# Patient Record
Sex: MALE | Employment: PART TIME | URBAN - METROPOLITAN AREA
[De-identification: names, ages, dates, MRNs, and addresses within clinical notes are randomized per-mention and may not be internally consistent; named-entity substitution may affect disease eponyms.]

---

## 2018-02-08 ENCOUNTER — TRANSFERRED RECORDS (OUTPATIENT)
Dept: HEALTH INFORMATION MANAGEMENT | Facility: CLINIC | Age: 73
End: 2018-02-08

## 2018-06-20 ENCOUNTER — ONCOLOGY VISIT (OUTPATIENT)
Dept: ONCOLOGY | Facility: CLINIC | Age: 73
End: 2018-06-20
Attending: INTERNAL MEDICINE
Payer: MEDICARE

## 2018-06-20 ENCOUNTER — CARE COORDINATION (OUTPATIENT)
Dept: ONCOLOGY | Facility: CLINIC | Age: 73
End: 2018-06-20

## 2018-06-20 VITALS
WEIGHT: 200.3 LBS | SYSTOLIC BLOOD PRESSURE: 132 MMHG | HEART RATE: 74 BPM | BODY MASS INDEX: 27.13 KG/M2 | OXYGEN SATURATION: 98 % | TEMPERATURE: 99.6 F | HEIGHT: 72 IN | DIASTOLIC BLOOD PRESSURE: 74 MMHG

## 2018-06-20 DIAGNOSIS — C61 MALIGNANT NEOPLASM OF PROSTATE (H): Primary | ICD-10-CM

## 2018-06-20 LAB
ALBUMIN SERPL-MCNC: 3.9 G/DL (ref 3.4–5)
ALP SERPL-CCNC: 67 U/L (ref 40–150)
ALT SERPL W P-5'-P-CCNC: 16 U/L (ref 0–70)
ANION GAP SERPL CALCULATED.3IONS-SCNC: 6 MMOL/L (ref 3–14)
AST SERPL W P-5'-P-CCNC: 12 U/L (ref 0–45)
BASOPHILS # BLD AUTO: 0 10E9/L (ref 0–0.2)
BASOPHILS NFR BLD AUTO: 0.4 %
BILIRUB SERPL-MCNC: 0.5 MG/DL (ref 0.2–1.3)
BUN SERPL-MCNC: 22 MG/DL (ref 7–30)
CALCIUM SERPL-MCNC: 8.6 MG/DL (ref 8.5–10.1)
CHLORIDE SERPL-SCNC: 109 MMOL/L (ref 94–109)
CO2 SERPL-SCNC: 27 MMOL/L (ref 20–32)
CREAT SERPL-MCNC: 0.89 MG/DL (ref 0.66–1.25)
DIFFERENTIAL METHOD BLD: NORMAL
EOSINOPHIL # BLD AUTO: 0.1 10E9/L (ref 0–0.7)
EOSINOPHIL NFR BLD AUTO: 1.4 %
ERYTHROCYTE [DISTWIDTH] IN BLOOD BY AUTOMATED COUNT: 13 % (ref 10–15)
GFR SERPL CREATININE-BSD FRML MDRD: 84 ML/MIN/1.7M2
GLUCOSE SERPL-MCNC: 89 MG/DL (ref 70–99)
HCT VFR BLD AUTO: 45.1 % (ref 40–53)
HGB BLD-MCNC: 14.6 G/DL (ref 13.3–17.7)
IMM GRANULOCYTES # BLD: 0 10E9/L (ref 0–0.4)
IMM GRANULOCYTES NFR BLD: 0.4 %
LYMPHOCYTES # BLD AUTO: 1 10E9/L (ref 0.8–5.3)
LYMPHOCYTES NFR BLD AUTO: 17.1 %
MCH RBC QN AUTO: 31.5 PG (ref 26.5–33)
MCHC RBC AUTO-ENTMCNC: 32.4 G/DL (ref 31.5–36.5)
MCV RBC AUTO: 97 FL (ref 78–100)
MONOCYTES # BLD AUTO: 0.4 10E9/L (ref 0–1.3)
MONOCYTES NFR BLD AUTO: 7.6 %
NEUTROPHILS # BLD AUTO: 4.1 10E9/L (ref 1.6–8.3)
NEUTROPHILS NFR BLD AUTO: 73.1 %
NRBC # BLD AUTO: 0 10*3/UL
NRBC BLD AUTO-RTO: 0 /100
PLATELET # BLD AUTO: 183 10E9/L (ref 150–450)
POTASSIUM SERPL-SCNC: 4.8 MMOL/L (ref 3.4–5.3)
PROT SERPL-MCNC: 7.3 G/DL (ref 6.8–8.8)
PSA SERPL-MCNC: 0.28 UG/L (ref 0–4)
RBC # BLD AUTO: 4.63 10E12/L (ref 4.4–5.9)
SODIUM SERPL-SCNC: 142 MMOL/L (ref 133–144)
WBC # BLD AUTO: 5.6 10E9/L (ref 4–11)

## 2018-06-20 PROCEDURE — 84403 ASSAY OF TOTAL TESTOSTERONE: CPT | Performed by: INTERNAL MEDICINE

## 2018-06-20 PROCEDURE — 36415 COLL VENOUS BLD VENIPUNCTURE: CPT

## 2018-06-20 PROCEDURE — 80053 COMPREHEN METABOLIC PANEL: CPT | Performed by: INTERNAL MEDICINE

## 2018-06-20 PROCEDURE — G0463 HOSPITAL OUTPT CLINIC VISIT: HCPCS | Mod: ZF

## 2018-06-20 PROCEDURE — 99205 OFFICE O/P NEW HI 60 MIN: CPT | Mod: ZP | Performed by: INTERNAL MEDICINE

## 2018-06-20 PROCEDURE — 85025 COMPLETE CBC W/AUTO DIFF WBC: CPT | Performed by: INTERNAL MEDICINE

## 2018-06-20 PROCEDURE — 84153 ASSAY OF PSA TOTAL: CPT | Performed by: INTERNAL MEDICINE

## 2018-06-20 RX ORDER — DUTASTERIDE AND TAMSULOSIN HYDROCHLORIDE CAPSULES .5; .4 MG/1; MG/1
CAPSULE ORAL
COMMUNITY
End: 2018-06-20 | Stop reason: DRUGHIGH

## 2018-06-20 RX ORDER — TAMSULOSIN HYDROCHLORIDE 0.4 MG/1
CAPSULE ORAL
Refills: 0 | COMMUNITY
Start: 2018-05-15 | End: 2018-06-22

## 2018-06-20 ASSESSMENT — PAIN SCALES - GENERAL: PAINLEVEL: NO PAIN (0)

## 2018-06-20 NOTE — LETTER
6/20/2018      RE: Jam Nice  [address]  [facility] [address]       [facility]    NEW PATIENT VISIT NOTE    PATIENT NAME: Jam Niec MRN # [id number]  DATE OF VISIT: June 20, 2018 YOB: 1945    REFERRING PROVIDER: Referred Self, MD  No address on file    CANCER TYPE: Prostate cancer  STAGE: M0 Stage II with biochemical PSA relapse post radiation therapy with concomitant hormones     TREATMENT SUMMARY:  - Prostate biopsy in 2008 for rising PSA - 4.8; revealed Cady 3+3 disease  -  He received two injections of three-month Zoladex Depot (10/3/08 and 1/5/09) in prep for concurrent radiation therapy but opted for surveillance  -  Rpt Prostate biopsy for rising PSA done on 10/13/10 revealed again Cady 3+3 disease  - He had repeat prostate biopsy for rising PSA on 8/26/14 which now revealed Cady 3+4 disease  - He was treated with ADT - Zoladex Depot and recieved 3 doses (10.8 mg) on October 8, 2014, January 9, 2015, and April 2, 2015, respectively.  - He underwent definitive radiation therapy from June 9, 2015 --- August 3, 2015 and received 7800 cGy in 39 fractions.  - He is being followed with PSA every 6 months since then    CURRENT INTERVENTIONS:  Observation     HISTORY OF PRESENT ILLNESS   Jam Ncie is 73 year old retired gentleman with no PMH other than prostate cancer treated with definitive therapy seeks to establish care at [facility]    Bill is alone at this visit. History is presented by him and as per charts from [facility]. He points me to the printed note from [facility] dated 6/13/17.   He had rising screening PSA which was elevated at 4.8. His prostate cancer therapy and progression has been summarized above. He completed his radiation therapy in Aug 2015 and has recovered well from therapy. His last ADT was in April 2015.     He has no residual complains. He has good energy and appetite. He has  unlimited exercise tolerance. He denies any weight loss. He is very active and does walk 5 miles daily.      PAST MEDICAL HISTORY   - Prostate cancer treated with definitive radiation therapy and concurrent hormonal therapy  - Squamous cell cancer      CURRENT OUTPATIENT MEDICATIONS     Current Outpatient Prescriptions   Medication Sig     PSYLLIUM HUSK PO      tamsulosin (FLOMAX) 0.4 MG capsule TAKE 1 CAPSULE BY MOUTH EVERY DAY     No current facility-administered medications for this visit.         ALLERGIES      Allergies   Allergen Reactions     Cats Cough and Itching     Simvastatin      Aches, low energy, not feeling well        SOCIAL HISTORY   - He is  and lives with his wife. He does not have kids. He has a dog.     - He is retired from [employer]. 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 He worked at [employer] for 10 yrs - part time.     - He does not smoke. He quit in . He smoked between 1-2 packs per day for about 21 yrs.  - He denies alcohol or recreational drug use.      FAMILY HISTORY   - Father  of heart attack 59 yrs of age  - Mother lived to be 90 yrs of age     REVIEW OF SYSTEMS   As above in the HPI, o/w complete 12-point ROS was negative.     PHYSICAL EXAM   /74  Pulse 74  Temp 99.6  F (37.6  C) (Oral)  Ht 1.829 m (6')  Wt 90.9 kg (200 lb 4.8 oz)  SpO2 98%  BMI 27.17 kg/m2   SpO2 Readings from Last 4 Encounters:   18 98%     Wt Readings from Last 3 Encounters:   18 90.9 kg (200 lb 4.8 oz)     GEN: NAD  HEENT: PERRL, EOMI, no icterus, injection or pallor. Oropharynx is clear.  NECK: no cervical or supraclavicular lymphadenopathy  LUNGS: clear bilaterally  CV: regular, no murmurs, rubs, or gallops  ABDOMEN: soft, non-tender, non-distended, normal bowel sounds, no hepatosplenomegaly by percussion or palpation  EXT: warm, well perfused, no edema  NEURO: alert  SKIN: no rashes     LABORATORY AND IMAGING STUDIES   Outside labs and scans reviewed      ASSESSMENT    1. Hormone  sensitive prostate cancer - Cady 3+4     s/p definitive radiation therapy 7800 cGy in 39 fractions from 6/9-8/3/15    ADT with 3 doses from Oct 2014 through April 2015  2. ECOG PS 0  3. No medical comorbidities  4. Followed every 6 months for surveillance    DISCUSSION     I had a lengthy discussion with Alvin who comes alone at this clinic visit.  He had definitive radiation therapy and received 1800 cGy in 39 fractions from 06/2015 through 08/2015.  He also received about 9 months of therapy with Zoladex for androgen depravation.  His PSA has been rising since 06/2016 when it was detectable at 0.13.  It has increased to 0.23 about a year ago.  At that time, he had a doubling time of 1 year, although not many values are available.  I did explain to him that this could just be from some normal prostate tissue that has survived despite radiation therapy.  For now, he does not have definitive recurrence.  We will continue to follow him every 6 months with PSA values.  He would like to transfer his care over here for convenience.  I would be most happy to arrange this.  I will have lab repeated today and then I will again see him in about 6 months.  I will release his lab by Lisa for his review.      I did explain that even if the PSA begins to rise it would not need to be treated any time soon.  Salvage surgery after definitive radiation therapy is very difficult.  We could get additional imaging in the form of Axumin PET/CT scan done, but the chances of easily resectable disease would remain low.  With recurrent disease, for the most part, would mean incurable but definitely treatable disease.  However, there is no benefit for early treatment of biochemically recurrent prostate cancer.  I would continue to observe him and treat him only if there is a significant rise in his PSA value.  There is no specific PSA value at which I initiate therapy, but it would also depend on the rate of rise-PSA doubling time of 6  months is of more concern and there would be more immediate need to initiate therapy.      He would like to switch his primary care physician to [facility] too.  I have suggested him a few names.      PLAN   Labs today including CBC, CMP, PSA and testosterone  I will see him in 6 months with labs prior to clinic visit     Over 60 min of direct face to face time spent with patient with more than 50% time spent in counseling and coordinating care.          Franky Arroyo  Adj ,  Division of Hematology, Oncology & Transplantation  [facility]. '      Franky Arroyo MD

## 2018-06-20 NOTE — LETTER
2018       RE: Jam Nice   [address] [facility] [address]     Dear Colleague,    Thank you for referring your patient, Jam Nice, to the [facility]. Please see a copy of my visit note below.    [facility]    NEW PATIENT VISIT NOTE    PATIENT NAME: Jam Nice MRN # [id number]  DATE OF VISIT: 2018 YOB: 1945    REFERRING PROVIDER: Referred Self, MD  No address on file    CANCER TYPE:  STAGE:      TREATMENT SUMMARY:    CURRENT INTERVENTIONS:     HISTORY OF PRESENT ILLNESS   Jam Nice is 73 year old retired gentleman with no PMH other than prostate cancer treated with definitive therapy seeks to establish care at [facility]    Bill is alone at this visit. History is presented by him and as per charts from [facility]. He points me to the printed note from [facility] dated 18.   He had rising screening PSA which was elevated at 4.8.     PAST MEDICAL HISTORY   - Prostate cancer treated with definitive radiation therapy and concurrent hormonal therapy  - Squamous cell cancer      CURRENT OUTPATIENT MEDICATIONS     Current Outpatient Prescriptions   Medication Sig     PSYLLIUM HUSK PO      tamsulosin (FLOMAX) 0.4 MG capsule TAKE 1 CAPSULE BY MOUTH EVERY DAY     No current facility-administered medications for this visit.         ALLERGIES      Allergies   Allergen Reactions     Cats Cough and Itching     Simvastatin      Aches, low energy, not feeling well        SOCIAL HISTORY   - He is  and lives with his wife. He does not have kids. He has a dog.     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     - He does not smoke. He quit in . He smoked between 1-2 packs per day for about 21 yrs.  - He denies alcohol or recreational drug use.      FAMILY HISTORY   - Father  of heart attack 59 yrs of age  - Mother  lived to be 90 yrs of age     REVIEW OF SYSTEMS   As above in the HPI, o/w complete 12-point ROS was negative.     PHYSICAL EXAM   /74  Pulse 74  Temp 99.6  F (37.6  C) (Oral)  Ht 1.829 m (6')  Wt 90.9 kg (200 lb 4.8 oz)  SpO2 98%  BMI 27.17 kg/m2   SpO2 Readings from Last 4 Encounters:   06/20/18 98%     Wt Readings from Last 3 Encounters:   06/20/18 90.9 kg (200 lb 4.8 oz)     GEN: NAD  HEENT: PERRL, EOMI, no icterus, injection or pallor. Oropharynx is clear.  NECK: no cervical or supraclavicular lymphadenopathy  LUNGS: clear bilaterally  CV: regular, no murmurs, rubs, or gallops  ABDOMEN: soft, non-tender, non-distended, normal bowel sounds, no hepatosplenomegaly by percussion or palpation  EXT: warm, well perfused, no edema  NEURO: alert  SKIN: no rashes     LABORATORY AND IMAGING STUDIES         ASSESSMENT        DISCUSSION          PLAN       Franky [facility] ,  Division of Hematology, Oncology & Transplantation  [facility]. '

## 2018-06-20 NOTE — MR AVS SNAPSHOT
After Visit Summary   6/20/2018    Jam Nice    MRN: [id number]           Patient Information     Date Of Birth          1945        Visit Information        Provider Department      6/20/2018 10:00 AM Franky Arroyo MD [facility]        Today's Diagnoses     Malignant neoplasm of prostate (H)    -  1       Follow-ups after your visit        Your next 10 appointments already scheduled     Aug 28, 2018  9:00 AM CDT   (Arrive by 8:45 AM)   PHYSICAL with Joey Davis MD   [facility] ([facility]    [address] [place] [address]  4th Floor  [facility] [address]   [phone]            Dec 18, 2018 10:00 AM CST   [facility] Draw with  [facility] DRAW   [facility] ([facility])    [address] [place] [address]  [address]  [facility] [id number] [phone]            Dec 19, 2018 10:00 AM CST   (Arrive by 9:45 AM)   Return Visit with Franky Arroyo MD   [facility] ([facility])    [address] [place] [address]  [address]  [facility] [id number] [phone]              Who to contact     If you have questions or need follow up information about today's clinic visit or your schedule please contact [facility] directly at [phone].  Normal or non-critical lab and imaging results will be communicated to you by MyChart, letter or phone within 4 business days after the clinic has received the results. If you do not hear from us within 7 days, please contact the clinic through MyChart or phone. If you have a critical or abnormal lab result, we will notify you by phone as soon as possible.  Submit refill requests through [employer] or call your pharmacy and they will forward the refill request to us. Please allow 3 business days for your refill to be completed.          Additional Information About Your Visit         [employer] Information     [employer] gives you secure access to your electronic health record. If you see a primary care provider, you can also send messages to your care team and make appointments. If you have questions, please call your primary care clinic.  If you do not have a primary care provider, please call [phone] and they will assist you.        Care EveryWhere ID     This is your Care EveryWhere ID. This could be used by other organizations to access your [facility] medical records  [id number]        Your Vitals Were     Pulse Temperature Height Pulse Oximetry BMI (Body Mass Index)       74 99.6  F (37.6  C) (Oral) 1.829 m (6') 98% 27.17 kg/m2        Blood Pressure from Last 3 Encounters:   No data found for BP    Weight from Last 3 Encounters:   No data found for Wt              Today, you had the following     No orders found for display         Today's Medication Changes          These changes are accurate as of 6/20/18 11:59 PM.  If you have any questions, ask your nurse or doctor.               Stop taking these medicines if you haven't already. Please contact your care team if you have questions.     dutasteride-tamsulosin HCl 0.5-0.4 MG Caps   Stopped by:  Franky Arroyo MD                    Primary Care Provider Office Phone # Fax #    Zeeshan Greco [phone]       [facility] [address]  [place] [address]        Equal Access to Services     [facility] : Hadii edelmira odeno Soallen, waaxda luqadaha, qaybta kaalmada ana, arnie bagley . So [facility] [phone].    ATENCIÓN: Si habla español, tiene a aguero disposición servicios gratuitos de asistencia lingüística. Llame al [phone].    We comply with applicable federal civil rights laws and [place] laws. We do not discriminate on the basis of race, color, national origin, age, disability, sex, sexual orientation, or gender identity.            Thank  you!     Thank you for choosing [facility]  for your care. Our goal is always to provide you with excellent care. Hearing back from our patients is one way we can continue to improve our services. Please take a few minutes to complete the written survey that you may receive in the mail after your visit with us. Thank you!             Your Updated Medication List - Protect others around you: Learn how to safely use, store and throw away your medicines at www.disposemymeds.org.          This list is accurate as of 6/20/18 11:59 PM.  Always use your most recent med list.                   Brand Name Dispense Instructions for use Diagnosis    PSYLLIUM HUSK PO       Malignant neoplasm of prostate (H)

## 2018-06-20 NOTE — PROGRESS NOTES
Writer met with patient and provided Dr. Arroyo's business card with contact information as well as wrote writer's contact information on the back of card.

## 2018-06-20 NOTE — PROGRESS NOTES
[facility]    NEW PATIENT VISIT NOTE    PATIENT NAME: Jam Nice MRN # [id number]  DATE OF VISIT: June 20, 2018 YOB: 1945    REFERRING PROVIDER: Referred Self, MD  No address on file    CANCER TYPE: Prostate cancer  STAGE: M0 Stage II with biochemical PSA relapse post radiation therapy with concomitant hormones     TREATMENT SUMMARY:  - Prostate biopsy in 2008 for rising PSA - 4.8; revealed White 3+3 disease  -  He received two injections of three-month Zoladex Depot (10/3/08 and 1/5/09) in prep for concurrent radiation therapy but opted for surveillance  -  Rpt Prostate biopsy for rising PSA done on 10/13/10 revealed again White 3+3 disease  - He had repeat prostate biopsy for rising PSA on 8/26/14 which now revealed Cady 3+4 disease  - He was treated with ADT - Zoladex Depot and recieved 3 doses (10.8 mg) on October 8, 2014, January 9, 2015, and April 2, 2015, respectively.  - He underwent definitive radiation therapy from June 9, 2015 --- August 3, 2015 and received 7800 cGy in 39 fractions.  - He is being followed with PSA every 6 months since then    CURRENT INTERVENTIONS:  Observation     HISTORY OF PRESENT ILLNESS   Jam Nice is 73 year old retired gentleman with no PMH other than prostate cancer treated with definitive therapy seeks to establish care at [facility]    Alvin is alone at this visit. History is presented by him and as per charts from [facility]. He points me to the printed note from [facility] dated 6/13/17.   He had rising screening PSA which was elevated at 4.8. His prostate cancer therapy and progression has been summarized above. He completed his radiation therapy in Aug 2015 and has recovered well from therapy. His last ADT was in April 2015.     He has no residual complains. He has good energy and appetite. He has unlimited exercise tolerance. He denies any weight loss. He is very active and does walk 5  miles daily.      PAST MEDICAL HISTORY   - Prostate cancer treated with definitive radiation therapy and concurrent hormonal therapy  - Squamous cell cancer      CURRENT OUTPATIENT MEDICATIONS     Current Outpatient Prescriptions   Medication Sig     PSYLLIUM HUSK PO      tamsulosin (FLOMAX) 0.4 MG capsule TAKE 1 CAPSULE BY MOUTH EVERY DAY     No current facility-administered medications for this visit.         ALLERGIES      Allergies   Allergen Reactions     Cats Cough and Itching     Simvastatin      Aches, low energy, not feeling well        SOCIAL HISTORY   - He is  and lives with his wife. He does not have kids. He has a dog.     - He is retired from [employer]. 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 He worked at [employer] for 10 yrs - part time.     - He does not smoke. He quit in . He smoked between 1-2 packs per day for about 21 yrs.  - He denies alcohol or recreational drug use.      FAMILY HISTORY   - Father  of heart attack 59 yrs of age  - Mother lived to be 90 yrs of age     REVIEW OF SYSTEMS   As above in the HPI, o/w complete 12-point ROS was negative.     PHYSICAL EXAM   /74  Pulse 74  Temp 99.6  F (37.6  C) (Oral)  Ht 1.829 m (6')  Wt 90.9 kg (200 lb 4.8 oz)  SpO2 98%  BMI 27.17 kg/m2   SpO2 Readings from Last 4 Encounters:   18 98%     Wt Readings from Last 3 Encounters:   18 90.9 kg (200 lb 4.8 oz)     GEN: NAD  HEENT: PERRL, EOMI, no icterus, injection or pallor. Oropharynx is clear.  NECK: no cervical or supraclavicular lymphadenopathy  LUNGS: clear bilaterally  CV: regular, no murmurs, rubs, or gallops  ABDOMEN: soft, non-tender, non-distended, normal bowel sounds, no hepatosplenomegaly by percussion or palpation  EXT: warm, well perfused, no edema  NEURO: alert  SKIN: no rashes     LABORATORY AND IMAGING STUDIES   Outside labs and scans reviewed      ASSESSMENT    1. Hormone sensitive prostate cancer - Cady 3+4     s/p definitive radiation therapy 7800 cGy in 39  fractions from 6/9-8/3/15    ADT with 3 doses from Oct 2014 through April 2015  2. ECOG PS 0  3. No medical comorbidities  4. Followed every 6 months for surveillance    DISCUSSION     I had a lengthy discussion with Alvin who comes alone at this clinic visit.  He had definitive radiation therapy and received 1800 cGy in 39 fractions from 06/2015 through 08/2015.  He also received about 9 months of therapy with Zoladex for androgen depravation.  His PSA has been rising since 06/2016 when it was detectable at 0.13.  It has increased to 0.23 about a year ago.  At that time, he had a doubling time of 1 year, although not many values are available.  I did explain to him that this could just be from some normal prostate tissue that has survived despite radiation therapy.  For now, he does not have definitive recurrence.  We will continue to follow him every 6 months with PSA values.  He would like to transfer his care over here for convenience.  I would be most happy to arrange this.  I will have lab repeated today and then I will again see him in about 6 months.  I will release his lab by Lisa for his review.      I did explain that even if the PSA begins to rise it would not need to be treated any time soon.  Salvage surgery after definitive radiation therapy is very difficult.  We could get additional imaging in the form of Axumin PET/CT scan done, but the chances of easily resectable disease would remain low.  With recurrent disease, for the most part, would mean incurable but definitely treatable disease.  However, there is no benefit for early treatment of biochemically recurrent prostate cancer.  I would continue to observe him and treat him only if there is a significant rise in his PSA value.  There is no specific PSA value at which I initiate therapy, but it would also depend on the rate of rise-PSA doubling time of 6 months is of more concern and there would be more immediate need to initiate therapy.       He would like to switch his primary care physician to [facility] too.  I have suggested him a few names.      PLAN   Labs today including CBC, CMP, PSA and testosterone  I will see him in 6 months with labs prior to clinic visit     Over 60 min of direct face to face time spent with patient with more than 50% time spent in counseling and coordinating care.          Franky [facility] ,  Division of Hematology, Oncology & Transplantation  [facility]. '

## 2018-06-20 NOTE — NURSING NOTE
Oncology Rooming Note    June 20, 2018 9:56 AM   Jam Nice is a 73 year old male who presents for:    Chief Complaint   Patient presents with     Oncology Clinic Visit     New patient; Prostate Ca     Initial Vitals: /74  Pulse 74  Temp 99.6  F (37.6  C) (Oral)  Ht 1.829 m (6')  Wt 90.9 kg (200 lb 4.8 oz)  SpO2 98%  BMI 27.17 kg/m2 Estimated body mass index is 27.17 kg/(m^2) as calculated from the following:    Height as of this encounter: 1.829 m (6').    Weight as of this encounter: 90.9 kg (200 lb 4.8 oz). Body surface area is 2.15 meters squared.  No Pain (0) Comment: Data Unavailable   No LMP for male patient.  Allergies reviewed: Yes  Medications reviewed: Yes    Medications: Medication refills not needed today.  Pharmacy name entered into EPIC: Data Unavailable    Clinical concerns: Transferring care from [facility] Dr Arroyo was notified.    12 minutes for nursing intake (face to face time)     Eneida Devine CMA

## 2018-06-20 NOTE — NURSING NOTE
Labs completed via venipuncture, patient discharged.    See flow sheets.    Eneida Devine CMA ([facility])

## 2018-06-21 LAB — TESTOST SERPL-MCNC: 223 NG/DL (ref 240–950)

## 2018-06-22 ENCOUNTER — TELEPHONE (OUTPATIENT)
Dept: INTERNAL MEDICINE | Facility: CLINIC | Age: 73
End: 2018-06-22

## 2018-06-22 DIAGNOSIS — C61 MALIGNANT NEOPLASM OF PROSTATE (H): ICD-10-CM

## 2018-06-22 RX ORDER — TAMSULOSIN HYDROCHLORIDE 0.4 MG/1
CAPSULE ORAL
Qty: 90 CAPSULE | Refills: 1 | Status: SHIPPED | OUTPATIENT
Start: 2018-06-22 | End: 2018-12-19

## 2018-06-22 NOTE — TELEPHONE ENCOUNTER
[facility] Call Center    Phone Message    May a detailed message be left on voicemail: yes    Reason for Call: Other: PT is being referred to Dr. Ojeda for a new PT.  Please review chart and follow up with the PT.     Action Taken: Message routed to:  [facility] ([facility] HIRAM

## 2018-06-28 NOTE — TELEPHONE ENCOUNTER
[employer] message sent to patient relaying information. Sandra Harden CMA at 11:32 AM on 6/28/2018

## 2018-06-29 ENCOUNTER — DOCUMENTATION ONLY (OUTPATIENT)
Dept: OTHER | Facility: CLINIC | Age: 73
End: 2018-06-29

## 2018-06-29 DIAGNOSIS — Z71.89 ACP (ADVANCE CARE PLANNING): Chronic | ICD-10-CM

## 2018-08-14 ASSESSMENT — ENCOUNTER SYMPTOMS
NAUSEA: 0
BLOATING: 0
BLOOD IN STOOL: 0
VOMITING: 0
CONSTIPATION: 0
JAUNDICE: 0
RECTAL PAIN: 0
ABDOMINAL PAIN: 0
BOWEL INCONTINENCE: 0
DIARRHEA: 0
HEARTBURN: 0

## 2018-08-14 ASSESSMENT — ACTIVITIES OF DAILY LIVING (ADL)
IN_THE_PAST_7_DAYS,_DID_YOU_NEED_HELP_FROM_OTHERS_TO_TAKE_CARE_OF_THINGS_SUCH_AS_LAUNDRY_AND_HOUSEKEEPING,_BANKING,_SHOPPING,_USING_THE_TELEPHONE,_FOOD_PREPARATION,_TRANSPORTATION,_OR_TAKING_YOUR_OWN_MEDICATIONS?: N
IN_THE_PAST_7_DAYS,_DID_YOU_NEED_HELP_FROM_OTHERS_TO_PERFORM_EVERYDAY_ACTIVITIES_SUCH_AS_EATING,_GETTING_DRESSED,_GROOMING,_BATHING,_WALKING,_OR_USING_THE_TOILET: N

## 2018-08-28 ENCOUNTER — OFFICE VISIT (OUTPATIENT)
Dept: INTERNAL MEDICINE | Facility: CLINIC | Age: 73
End: 2018-08-28
Payer: MEDICARE

## 2018-08-28 VITALS
BODY MASS INDEX: 26.95 KG/M2 | SYSTOLIC BLOOD PRESSURE: 134 MMHG | RESPIRATION RATE: 20 BRPM | DIASTOLIC BLOOD PRESSURE: 80 MMHG | OXYGEN SATURATION: 97 % | HEART RATE: 76 BPM | WEIGHT: 198.7 LBS

## 2018-08-28 DIAGNOSIS — Z00.00 ROUTINE HEALTH MAINTENANCE: Primary | ICD-10-CM

## 2018-08-28 DIAGNOSIS — Z11.59 NEED FOR HEPATITIS C SCREENING TEST: ICD-10-CM

## 2018-08-28 DIAGNOSIS — H91.93 BILATERAL HEARING LOSS, UNSPECIFIED HEARING LOSS TYPE: ICD-10-CM

## 2018-08-28 DIAGNOSIS — Z13.6 SCREENING FOR AAA (ABDOMINAL AORTIC ANEURYSM): ICD-10-CM

## 2018-08-28 DIAGNOSIS — Z87.891 HISTORY OF SMOKING: ICD-10-CM

## 2018-08-28 LAB — HCV AB SERPL QL IA: NONREACTIVE

## 2018-08-28 PROCEDURE — G0472 HEP C SCREEN HIGH RISK/OTHER: HCPCS | Performed by: INTERNAL MEDICINE

## 2018-08-28 RX ORDER — AMPICILLIN TRIHYDRATE 250 MG
600 CAPSULE ORAL 2 TIMES DAILY
Qty: 200 CAPSULE | Refills: 3 | COMMUNITY
Start: 2018-08-28 | End: 2018-12-21

## 2018-08-28 ASSESSMENT — PAIN SCALES - GENERAL: PAINLEVEL: NO PAIN (0)

## 2018-08-28 NOTE — PATIENT INSTRUCTIONS
[facility] Medication Refill Request Information:  * Please contact your pharmacy regarding ANY request for medication refills.  ** [facility] Prescription Fax = [phone]  * Please allow 3 business days for routine medication refills.  * Please allow 5 business days for controlled substance medication refills.     [facility] Test Result notification information:  *You will be notified with in 7-10 days of your appointment day regarding the results of your test.  If you are on MyChart you will be notified as soon as the provider has reviewed the results and signed off on them.    [facility]: [phone]

## 2018-08-28 NOTE — NURSING NOTE
Prevnar 13 shot given without problems, patient tolerated procedure well.Delores Young LPN 10:23 AM on 8/28/2018

## 2018-08-28 NOTE — NURSING NOTE
Chief Complaint   Patient presents with     Physical     Medicare wellness   Delores Young LPN 8:50 AM on 8/28/2018    Rooming Note  Health Maintenance   Health Maintenance Due   Topic Date Due     HEPATITIS C SCREENING  02/11/1963     AORTIC ANEURYSM SCREENING (SYSTEM ASSIGNED)  02/11/2011     PNEUMOCOCCAL (2 of 2 - PCV13) 11/30/2016    All health maintenance items discussed and pended.  Delores Young LPN 8:52 AM on 8/28/2018

## 2018-08-28 NOTE — MR AVS SNAPSHOT
After Visit Summary   8/28/2018    Jam Nice    MRN: [id number]           Patient Information     Date Of Birth          1945        Visit Information        Provider Department      8/28/2018 9:00 AM Joey Davis MD [facility]        Today's Diagnoses     Routine health maintenance    -  1    Need for hepatitis C screening test        Screening for AAA (abdominal aortic aneurysm)        History of smoking        Bilateral hearing loss, unspecified hearing loss type          Care Instructions    Primary Care Center Medication Refill Request Information:  * Please contact your pharmacy regarding ANY request for medication refills.  ** [facility] Prescription Fax = [phone]  * Please allow 3 business days for routine medication refills.  * Please allow 5 business days for controlled substance medication refills.     Primary Care Center Test Result notification information:  *You will be notified with in 7-10 days of your appointment day regarding the results of your test.  If you are on MyChart you will be notified as soon as the provider has reviewed the results and signed off on them.    [facility]: [phone]           Follow-ups after your visit        Additional Services     AUDIOLOGY ADULT REFERRAL       [facility] - [place] [phone]   [email or web address]    Specialty Testing:  Audiogram w/Tymps and Reflexes (Comprehensive Audiology Evaluation)            OTOLARYNGOLOGY REFERRAL       Your provider has referred you to: [facility]: Adult Ear, Nose and Throat Clinic (Otolaryngology) - [place] [phone]  [email or web address]    Please be aware that coverage of these services is subject to the terms and limitations of your health insurance plan.  Call member services at  your health plan with any benefit or coverage questions.      Please bring the following with you to your appointment:    (1) Any X-Rays, CTs or MRIs which have been performed.  Contact the facility where they were done to arrange for  prior to your scheduled appointment.   (2) List of current medications  (3) This referral request   (4) Any documents/labs given to you for this referral                  Follow-up notes from your care team     Return in about 1 year (around 8/28/2019).      Your next 10 appointments already scheduled     Oct 29, 2018 10:30 AM CDT   (Arrive by 10:15 AM)   New Patient Visit with Marina Montilla MD   [facility] Dermatology ([facility])    [address]  3rd [address]  [facility] [address] [phone]            Dec 18, 2018 10:00 AM CST   [facility] Lab Draw with  MASONIC LAB DRAW   [facility] Lab Draw ([facility])    [address] [place] [address]  [address]  [facility] [id number] [phone]            Dec 19, 2018 10:00 AM CST   (Arrive by 9:45 AM)   Return Visit with Franky Arroyo MD   [facility] ([facility])    [address] [place] [address]  [address]  [facility] [id number] [phone]              Future tests that were ordered for you today     Open Future Orders        Priority Expected Expires Ordered    Hepatitis C Screen Reflex to HCV RNA Quant and Genotype Routine 8/28/2018 8/28/2019 8/28/2018    US Aorta Medicare AAA Screening Routine  8/28/2019 8/28/2018            Who to contact     Please call your clinic at [phone] to:    Ask questions about your health    Make or cancel appointments    Discuss your medicines    Learn about your test results    Speak to your doctor            Additional Information About Your Visit        [employer] Information     [employer] gives you secure access to your electronic health record. If you see a primary care  provider, you can also send messages to your care team and make appointments. If you have questions, please call your primary care clinic.  If you do not have a primary care provider, please call [phone] and they will assist you.      [facility] is an electronic gateway that provides easy, online access to your medical records. With [facility], you can request a clinic appointment, read your test results, renew a prescription or communicate with your care team.     To access your existing account, please contact your [facility] or call [phone] for assistance.        Care EveryWhere ID     This is your Care EveryWhere ID. This could be used by other organizations to access your [place] medical records  [id number]        Your Vitals Were     Pulse Respirations Pulse Oximetry BMI (Body Mass Index)          76 20 97% 26.95 kg/m2         Blood Pressure from Last 3 Encounters:   08/28/18 134/80   06/20/18 132/74    Weight from Last 3 Encounters:   08/28/18 90.1 kg (198 lb 11.2 oz)   06/20/18 90.9 kg (200 lb 4.8 oz)              We Performed the Following     Abdominal Aortic Aneurysm Screening/Tracking     AUDIOLOGY ADULT REFERRAL     OTOLARYNGOLOGY REFERRAL     Pneumococcal vaccine 13 valent PCV13 IM (Prevnar) [07175]        Primary Care Provider Office Phone # Fax #    Joey Davis [phone]       [address] [place] [address]        Equal Access to Services     DAMARIS EPPERSON : Hadii aad ku hadasho Soomaali, waaxda luqadaha, qaybta kaalmada adeegyada, arnie avina. So [facility] [phone].    ATENCIÓN: Si habla español, tiene a aguero disposición servicios gratuitos de asistencia lingüística. Llame al [phone].    We comply with applicable federal civil rights laws and [place] laws. We do not discriminate on the basis of race, color, national origin, age, disability, sex, sexual orientation, or gender  identity.            Thank you!     Thank you for choosing [facility]  for your care. Our goal is always to provide you with excellent care. Hearing back from our patients is one way we can continue to improve our services. Please take a few minutes to complete the written survey that you may receive in the mail after your visit with us. Thank you!             Your Updated Medication List - Protect others around you: Learn how to safely use, store and throw away your medicines at www.disposemymeds.org.          This list is accurate as of 8/28/18  9:41 AM.  Always use your most recent med list.                   Brand Name Dispense Instructions for use Diagnosis    PSYLLIUM HUSK PO       Malignant neoplasm of prostate (H)       red yeast rice 600 MG Caps     200 capsule    Take 1 capsule (600 mg) by mouth 2 times daily        tamsulosin 0.4 MG capsule    FLOMAX    90 capsule    TAKE 1 CAPSULE BY MOUTH EVERY DAY    Malignant neoplasm of prostate (H)

## 2018-08-28 NOTE — PROGRESS NOTES
HPI  73-year-old presents today to establish primary care.  He is been in excellent health with exception of his prostate cancer.  This was initially treated with observation sequential biopsies progressed to a Sims grade 7 and he was treated with androgen deprivation therapy and external beam radiation.  He has a low PSA at the present time.  He has had no associated incontinence or urinary leakage.  He has been impotent with erectile dysfunction but this is not been an issue for him and his wife and is not interested in pursuing this.  Said history of basal cell skin cancer informed history of smoking is otherwise been doing very well said some hearing loss which is been followed by audiology and ENT is requesting follow-up here regarding this.  His cholesterols been elevated LDL of 120 however he is intolerant of statins due to muscle aching.  Otherwise feeling well with no specific complaints or concerns today    Past and Family hx reviewed and updated    Past Medical History:   Diagnosis Date     Basal cell carcinoma of skin      Hyperlipidemia     statin intolerant     Prostate cancer (H)     S/P radiation rx     Past Surgical History:   Procedure Laterality Date     .menisectomy Left 1967     HERNIA REPAIR  89     Family History   Problem Relation Age of Onset     Alcoholism Mother       90     Coronary Artery Disease Father       51 MI     Alcoholism Sister       55     Social History     Social History     Marital status:      Spouse name: N/A     Number of children: N/A     Years of education: N/A     Occupational History     retired [employer]     Social History Main Topics     Smoking status: Former Smoker     Types: Cigarettes     Start date: 1964     Quit date: 1985     Smokeless tobacco: Never Used     Alcohol use No     Drug use: Not on file     Sexual activity: Not on file     Other Topics Concern     Not on file     Social History Narrative     Answers for JACQUELYN/XIOMY  submitted by the patient on 8/14/2018   General Symptoms: No  Skin Symptoms: No  HENT Symptoms: No  EYE SYMPTOMS: No  HEART SYMPTOMS: No  LUNG SYMPTOMS: No  INTESTINAL SYMPTOMS: Yes  URINARY SYMPTOMS: No  REPRODUCTIVE SYMPTOMS: No  SKELETAL SYMPTOMS: No  BLOOD SYMPTOMS: No  NERVOUS SYSTEM SYMPTOMS: No  MENTAL HEALTH SYMPTOMS: No  Heart burn or indigestion: No  Nausea: No  Vomiting: No  Abdominal pain: No  Bloating: No  Constipation: No  Diarrhea: No  Blood in stool: No  Black stools: No  Rectal or Anal pain: No  Fecal incontinence: No  Yellowing of skin or eyes: No  Vomit with blood: No  Change in stools: No    Exam:  /80 (BP Location: Right arm, Patient Position: Sitting, Cuff Size: Adult Regular)  Pulse 76  Resp 20  Wt 90.1 kg (198 lb 11.2 oz)  SpO2 97%  BMI 26.95 kg/m2  198 lbs 11.2 oz  Physical Exam   The patient is alert, oriented with a clear sensorium.   Skin shows no lesions or rashes and good turgor.   Head is normocephalic and atraumatic.   Eyes show PERRLA with benign optic fundi.   Ears show normal TMs bilaterally.   Mouth shows clear oral mucosa.   Neck shows no nodes, thyromegaly or bruits.   Back is non tender.  Lungs are clear to percussion and auscultation.   Heart shows normal S1 and S2 without murmur or gallop.   Abdomen is soft and nontender without masses or organomegaly.   Genitalia show atrophic testes. No evidence of inguinal hernia.  Extremities show no edema and no evidence of active synovitis.   Neurologic examination shows cranial nerves II-XII intact. Motor shows 5/5 strength. Reflexes are symmetric. Cerebellar testing shows normal tandem gait.  Romberg negative.    ASSESSMENT  1 prostate cancer status post radiation therapy  2 hyperlipidemia statin intolerant  3 history of basal cell skin cancer status post excision  4 hearing loss    Plan  Refer to audiology and ENT, and have him try red yeast rice for the cholesterol is a former smoker so we will check an abdominal aortic  ultrasound screening for hepatitis C and update his immunizations with a Prevnar have him reassessed in a year    This note was completed using Dragon voice recognition software.  Although reviewed after completion, some word and grammatical errors may occur.    Joey Davis MD  General Internal Medicine  Primary Care Center  [phone]

## 2018-08-29 ENCOUNTER — DOCUMENTATION ONLY (OUTPATIENT)
Dept: VASCULAR SURGERY | Facility: CLINIC | Age: 73
End: 2018-08-29

## 2018-10-22 ENCOUNTER — TELEPHONE (OUTPATIENT)
Dept: DERMATOLOGY | Facility: CLINIC | Age: 73
End: 2018-10-22

## 2018-10-22 NOTE — TELEPHONE ENCOUNTER
Dermatology Pre-visit Call:    Reason for visit : Skin check     Any personal history of skin cancers: Yes    Was the patient referred: No    If the patient was referred, are records obtained: Yes. (If no, then obtain records). Care everywhere     Has the patient seen a dermatologist in the past: No. (If yes, obtain records)    Patient Reminders Given:  --Please, make sure you bring an updated list of your medications.   --Plan on being in our facility for approximately one hour, this includes the registration process, office visit, education and check-out process.  If you are having a procedure, more time may be required.     --If you are having a procedure, please, present 15 minutes early.  --Location reviewed.   --If you need to cancel or reschedule, call  [phone]  --We look forward to seeing you in Dermatology Clinic.

## 2018-10-29 ENCOUNTER — OFFICE VISIT (OUTPATIENT)
Dept: DERMATOLOGY | Facility: CLINIC | Age: 73
End: 2018-10-29
Payer: MEDICARE

## 2018-10-29 ENCOUNTER — ALLIED HEALTH/NURSE VISIT (OUTPATIENT)
Dept: INTERNAL MEDICINE | Facility: CLINIC | Age: 73
End: 2018-10-29
Payer: MEDICARE

## 2018-10-29 DIAGNOSIS — L40.9 PSORIASIS: Primary | ICD-10-CM

## 2018-10-29 DIAGNOSIS — Z23 NEED FOR PROPHYLACTIC VACCINATION AND INOCULATION AGAINST INFLUENZA: Primary | ICD-10-CM

## 2018-10-29 DIAGNOSIS — L57.0 AK (ACTINIC KERATOSIS): ICD-10-CM

## 2018-10-29 ASSESSMENT — PAIN SCALES - GENERAL
PAINLEVEL: NO PAIN (1)
PAINLEVEL: NO PAIN (0)

## 2018-10-29 NOTE — NURSING NOTE
Jam Nice comes into clinic today at the request of CHAPINCITO QUINTERO MD Ordering Provider for flu vaccine.    This service provided today was under the supervising provider of the day CHAPINCITO QUINTERO MD, who was available if needed.    Giovani Bazan

## 2018-10-29 NOTE — PATIENT INSTRUCTIONS
Cryotherapy    What is it?    Use of a very cold liquid, such as liquid nitrogen, to freeze and destroy abnormal skin cells that need to be removed    What should I expect?    Tenderness and redness    A small blister that might grow and fill with dark purple blood. There may be crusting.    More than one treatment may be needed if the lesions do not go away.    How do I care for the treated area?    Gently wash the area with your hands when bathing.    Use a thin layer of Vaseline to help with healing. You may use a Band-Aid.     The area should heal within 7-10 days and may leave behind a pink or lighter color.     Do not use an antibiotic or Neosporin ointment.     You may take acetaminophen (Tylenol) for pain.     Call your Doctor if you have:    Severe pain    Signs of infection (warmth, redness, cloudy yellow drainage, and or a bad smell)    Questions or concerns    Who should I call with questions?       [facility]: [phone]       [facility]: [phone]       For urgent needs outside of business hours call the [facility] at [phone]        and ask for the dermatology resident on call

## 2018-10-29 NOTE — NURSING NOTE
Dermatology Rooming Note    Jam Nice's goals for this visit include:   Chief Complaint   Patient presents with     Skin Check     Jam is here for a skin check and MOHS surgery.         Kailee Lance, CMA

## 2018-10-29 NOTE — MR AVS SNAPSHOT
After Visit Summary   10/29/2018    Jam Nice    MRN: [id number]           Patient Information     Date Of Birth          1945        Visit Information        Provider Department      10/29/2018 10:30 AM Marina Montilla MD [facility] Dermatology        Care Instructions    Cryotherapy    What is it?    Use of a very cold liquid, such as liquid nitrogen, to freeze and destroy abnormal skin cells that need to be removed    What should I expect?    Tenderness and redness    A small blister that might grow and fill with dark purple blood. There may be crusting.    More than one treatment may be needed if the lesions do not go away.    How do I care for the treated area?    Gently wash the area with your hands when bathing.    Use a thin layer of Vaseline to help with healing. You may use a Band-Aid.     The area should heal within 7-10 days and may leave behind a pink or lighter color.     Do not use an antibiotic or Neosporin ointment.     You may take acetaminophen (Tylenol) for pain.     Call your Doctor if you have:    Severe pain    Signs of infection (warmth, redness, cloudy yellow drainage, and or a bad smell)    Questions or concerns    Who should I call with questions?       [facility]: [phone]       [facility]: [phone]       For urgent needs outside of business hours call the [facility] at [phone]        and ask for the dermatology resident on call          Follow-ups after your visit        Your next 10 appointments already scheduled     Dec 18, 2018 10:00 AM CST   [facility] Lab Draw with  MASONIC LAB DRAW   [facility] Draw ([facility])    [address] [place] [address]  [address]  [facility] [address] [phone]            Dec 19, 2018 10:00 AM CST   (Arrive by 9:45 AM)   Return Visit with Franky Arroyo MD   [facility] ([facility])    [address]  [address]  [facility] [address] [phone]              Who to contact     Please call your clinic at [phone] to:    Ask questions about your health    Make or cancel appointments    Discuss your medicines    Learn about your test results    Speak to your doctor            Additional Information About Your Visit        MyChart Information     [employer] gives you secure access to your electronic health record. If you see a primary care provider, you can also send messages to your care team and make appointments. If you have questions, please call your primary care clinic.  If you do not have a primary care provider, please call [phone] and they will assist you.      [employer] is an electronic gateway that provides easy, online access to your medical records. With [employer], you can request a clinic appointment, read your test results, renew a prescription or communicate with your care team.     To access your existing account, please contact your [facility] or call [phone] for assistance.        Care EveryWhere ID     This is your Care EveryWhere ID. This could be used by other organizations to access your [place] medical records  [id number]         Blood Pressure from Last 3 Encounters:   08/28/18 134/80   06/20/18 132/74    Weight from Last 3 Encounters:   08/28/18 90.1 kg (198 lb 11.2 oz)   06/20/18 90.9 kg (200 lb 4.8 oz)              Today, you had the following     No orders found for display       Primary Care Provider Office Phone # Fax #    Joey Davis [phone]       [address] [place] [address]  [facility] [address]        Equal Access to Services     [facility] : Hadii edelmira Sanchez, waaxda luqadaha, qaybta kaalarnie mccarthy. So [facility] [phone].    ATENCIÓN: Si habla español, tiene a aguero disposición servicios gratuitos  de asistencia lingüística. Efe de santiago [phone].    We comply with applicable federal civil rights laws and [place] laws. We do not discriminate on the basis of race, color, national origin, age, disability, sex, sexual orientation, or gender identity.            Thank you!     Thank you for choosing [facility]  for your care. Our goal is always to provide you with excellent care. Hearing back from our patients is one way we can continue to improve our services. Please take a few minutes to complete the written survey that you may receive in the mail after your visit with us. Thank you!             Your Updated Medication List - Protect others around you: Learn how to safely use, store and throw away your medicines at www.disposemymeds.org.          This list is accurate as of 10/29/18 11:24 AM.  Always use your most recent med list.                   Brand Name Dispense Instructions for use Diagnosis    PSYLLIUM HUSK PO       Malignant neoplasm of prostate (H)       red yeast rice 600 MG Caps     200 capsule    Take 1 capsule (600 mg) by mouth 2 times daily        tamsulosin 0.4 MG capsule    FLOMAX    90 capsule    TAKE 1 CAPSULE BY MOUTH EVERY DAY    Malignant neoplasm of prostate (H)

## 2018-10-29 NOTE — PROGRESS NOTES
"[facility] Dermatology Note      Dermatology Problem List:  1. 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         History of Present Illness:  Mr. Jam Nice is a 73 year old male who presents for skin check.     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 Notes rare pain and itching in area of scar which resolves spontaneously without any intervention, notes he cannot directly see scar area to see skin changes but denies any bleeding from area. Also notes \"mole\" lesion on right upper back which he notes has been stable and has been told by other providers that it should be looked at.  Further notes he uses triamcinolone cream for left LE rash (which is currently not present). Also rarely uses betamethasone cream for psoriasis on left knee.     Past Medical History:   Patient Active Problem List   Diagnosis     Malignant neoplasm of prostate (H)     ACP (advance care planning)     Past Medical History:   Diagnosis Date     Basal cell carcinoma of skin      Hyperlipidemia     statin intolerant     Prostate cancer (H)     S/P radiation rx     Past Surgical History:   Procedure Laterality Date     .menisectomy Left 1967     HERNIA REPAIR         Social History:  Patient  reports that he quit smoking about 33 years ago. His smoking use included Cigarettes. He started smoking about 54 years ago. He has never used smokeless tobacco. He reports that he does not drink alcohol.    Family History:  Family History   Problem Relation Age of Onset     Alcoholism Mother       90     Coronary Artery Disease Father       51 MI     Alcoholism Sister       55       Medications:  Current Outpatient Prescriptions "   Medication Sig Dispense Refill     PSYLLIUM HUSK PO        tamsulosin (FLOMAX) 0.4 MG capsule TAKE 1 CAPSULE BY MOUTH EVERY DAY 90 capsule 1     red yeast rice 600 MG CAPS Take 1 capsule (600 mg) by mouth 2 times daily 200 capsule 3        Allergies   Allergen Reactions     Cats Cough and Itching     Simvastatin      Aches, low energy, not feeling well         Review of Systems:  -As per HPI  -Constitutional: The patient denies fatigue, fevers, chills, unintended weight loss, and night sweats.  -HEENT: Patient denies nonhealing oral sores.  -Skin: As above in HPI. No additional skin concerns.    Physical exam:  Vitals: There were no vitals taken for this visit.  GEN: This is a well developed, well-nourished male in no acute distress, in a pleasant mood.    SKIN: Total skin excluding the undergarment areas was performed. The exam included the head/face, neck, both arms, chest, back, abdomen, both legs, digits and/or nails.   Right posterior ear with well healing scar, no erythema or visible lesion at area of scar. Few scattered benign appearing lesions on trunk (including right back). Small raised patch of well demarcated scaly skin on left knee c/w small area of psoriasis. Small scalely lesion on right temple (applied cryo).    -No other lesions of concern on areas examined.     Impression/Plan:  #1. 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   -continue yearly f/u     #2. Psoriasis, left knee   -triamcinolone vs betamethasone cream PRN    #3. Likely atopic dermatitis   Suspected this is etiology of rash on LE (not present today) which resolved with triamcinolone.   -triamcinolone PRN     # 4. Actinic keratosis  Small lesion on right temple with cryo applied today.     Cryotherapy procedure note: After verbal consent and discussion of risks and benefits including but no limited to dyspigmentation/scar, blister, and pain, one was(were) treated with  1-2mm freeze border for 2 cycles with liquid nitrogen. Post cryotherapy instructions were provided.     Follow-up in one year.       Dr. Montilla staffed the patient.    Staff Involved:  Resident(Dario Phan)/Staff(as above)  I was present for the entire procedure. KB  .I, Marina Montilla MD, saw this patient with the resident and agree with the resident s findings and plan of care as documented in the resident s note.

## 2018-10-29 NOTE — MR AVS SNAPSHOT
After Visit Summary   10/29/2018    Jam Nice    MRN: [id number]           Patient Information     Date Of Birth          1945        Visit Information        Provider Department      10/29/2018 9:30 AM Nurse, [facility]        Today's Diagnoses     Need for prophylactic vaccination and inoculation against influenza    -  1       Follow-ups after your visit        Your next 10 appointments already scheduled     Oct 29, 2018 10:30 AM CDT   (Arrive by 10:15 AM)   New Patient Visit with Marina Montilla MD   [facility] Dermatology ([facility])    [address] [place] [address]  3rd Floor  [facility] [id number] [phone]            Dec 18, 2018 10:00 AM CST   [facility] Draw with [facility] LAB DRAW   [facility] Lab Draw ([facility])    [address] [place] [address]  [address]  [facility] [id number] [phone]            Dec 19, 2018 10:00 AM CST   (Arrive by 9:45 AM)   Return Visit with Franky Arroyo MD   [facility] ([facility])    [address] [place] [address]  [address]  [facility] [id number] [phone]              Who to contact     Please call your clinic at [phone] to:    Ask questions about your health    Make or cancel appointments    Discuss your medicines    Learn about your test results    Speak to your doctor            Additional Information About Your Visit        [employer] Information     [employer] gives you secure access to your electronic health record. If you see a primary care provider, you can also send messages to your care team and make appointments. If you have questions, please call your primary care clinic.  If you do not have a primary care provider, please call [phone] and they will assist you.      [employer] is an electronic gateway that provides easy, online access to your medical records. With [employer], you can request a clinic  appointment, read your test results, renew a prescription or communicate with your care team.     To access your existing account, please contact your [facility] or call [phone] for assistance.        Care EveryWhere ID     This is your Care EveryWhere ID. This could be used by other organizations to access your [facility] medical records  [id number]         Blood Pressure from Last 3 Encounters:   08/28/18 134/80   06/20/18 132/74    Weight from Last 3 Encounters:   08/28/18 90.1 kg (198 lb 11.2 oz)   06/20/18 90.9 kg (200 lb 4.8 oz)              We Performed the Following     FLU VACCINE, INCREASED ANTIGEN, PRESV FREE, AGE 65+ [36480]        Primary Care Provider Office Phone # Fax #    Joey Davis [phone]       [address] [place] [address]        Equal Access to Services     DAMARIS EPPERSON : Hadii aad ku hadasho Soomaali, waaxda luqadaha, qaybta kaalmada adeegyada, waxay idiin hayaan robert bagley . So [facility] [phone].    ATENCIÓN: Si habla español, tiene a aguero disposición servicios gratuitos de asistencia lingüística. Efe al [phone].    We comply with applicable federal civil rights laws and [place] laws. We do not discriminate on the basis of race, color, national origin, age, disability, sex, sexual orientation, or gender identity.            Thank you!     Thank you for choosing [facility]  for your care. Our goal is always to provide you with excellent care. Hearing back from our patients is one way we can continue to improve our services. Please take a few minutes to complete the written survey that you may receive in the mail after your visit with us. Thank you!             Your Updated Medication List - Protect others around you: Learn how to safely use, store and throw away your medicines at www.disposemymeds.org.          This list is accurate as of 10/29/18  9:41 AM.  Always use your  most recent med list.                   Brand Name Dispense Instructions for use Diagnosis    PSYLLIUM HUSK PO       Malignant neoplasm of prostate (H)       red yeast rice 600 MG Caps     200 capsule    Take 1 capsule (600 mg) by mouth 2 times daily        tamsulosin 0.4 MG capsule    FLOMAX    90 capsule    TAKE 1 CAPSULE BY MOUTH EVERY DAY    Malignant neoplasm of prostate (H)

## 2018-10-29 NOTE — NURSING NOTE
Jma Nice      1.  Has the patient received the information for the influenza vaccine? YES    2.  Does the patient have any of the following contraindications?     Allergy to eggs? No     Allergic reaction to previous influenza vaccines? No     Any other problems to previous influenza vaccines? No     Paralyzed by Guillain-[place] syndrome? No     Currently pregnant? NO     Current moderate or severe illness? No     Allergy to contact lens solution? No    3.  The vaccine has been administered in the usual fashion and the patient was instructed to wait 20 minutes before leaving the building in the event of an allergic reaction: YES    Recorded by Giovani Bazan

## 2018-10-29 NOTE — LETTER
"10/29/2018       RE: Jam Nice  [address]  [facility] [address]     Dear Colleague,    Thank you for referring your patient, Jam Nice, to the [facility] DERMATOLOGY at [facility]. Please see a copy of my visit note below.    [facility] Dermatology Note      Dermatology Problem List:  1. 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         History of Present Illness:  Mr. Jam Nice is a 73 year old male who presents for skin check.     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 Notes rare pain and itching in area of scar which resolves spontaneously without any intervention, notes he cannot directly see scar area to see skin changes but denies any bleeding from area. Also notes \"mole\" lesion on right upper back which he notes has been stable and has been told by other providers that it should be looked at.  Further notes he uses triamcinolone cream for left LE rash (which is currently not present). Also rarely uses betamethasone cream for psoriasis on left knee.     Past Medical History:   Patient Active Problem List   Diagnosis     Malignant neoplasm of prostate (H)     ACP (advance care planning)     Past Medical History:   Diagnosis Date     Basal cell carcinoma of skin      Hyperlipidemia     statin intolerant     Prostate cancer (H) 2012    S/P radiation rx     Past Surgical History:   Procedure Laterality Date     .menisectomy Left 1967     HERNIA REPAIR  1948       Social History:  Patient  reports that he quit smoking about 33 years ago. His smoking use included Cigarettes. He started smoking about 54 years ago. He has never used smokeless tobacco. " He reports that he does not drink alcohol.    Family History:  Family History   Problem Relation Age of Onset     Alcoholism Mother       90     Coronary Artery Disease Father       51 MI     Alcoholism Sister       55       Medications:  Current Outpatient Prescriptions   Medication Sig Dispense Refill     PSYLLIUM HUSK PO        tamsulosin (FLOMAX) 0.4 MG capsule TAKE 1 CAPSULE BY MOUTH EVERY DAY 90 capsule 1     red yeast rice 600 MG CAPS Take 1 capsule (600 mg) by mouth 2 times daily 200 capsule 3        Allergies   Allergen Reactions     Cats Cough and Itching     Simvastatin      Aches, low energy, not feeling well         Review of Systems:  -As per HPI  -Constitutional: The patient denies fatigue, fevers, chills, unintended weight loss, and night sweats.  -HEENT: Patient denies nonhealing oral sores.  -Skin: As above in HPI. No additional skin concerns.    Physical exam:  Vitals: There were no vitals taken for this visit.  GEN: This is a well developed, well-nourished male in no acute distress, in a pleasant mood.    SKIN: Total skin excluding the undergarment areas was performed. The exam included the head/face, neck, both arms, chest, back, abdomen, both legs, digits and/or nails.   Right posterior ear with well healing scar, no erythema or visible lesion at area of scar. Few scattered benign appearing lesions on trunk (including right back). Small raised patch of well demarcated scaly skin on left knee c/w small area of psoriasis. Small scalely lesion on right temple (applied cryo).    -No other lesions of concern on areas examined.     Impression/Plan:  #1. 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   -continue yearly f/u     #2. Psoriasis, left knee   -triamcinolone vs betamethasone cream PRN    #3. Likely atopic dermatitis   Suspected this is etiology of rash on LE (not present today) which resolved with triamcinolone.    -triamcinolone PRN     # 4. Actinic keratosis  Small lesion on right temple with cryo applied today.     Cryotherapy procedure note: After verbal consent and discussion of risks and benefits including but no limited to dyspigmentation/scar, blister, and pain, one was(were) treated with 1-2mm freeze border for 2 cycles with liquid nitrogen. Post cryotherapy instructions were provided.     Follow-up in one year.       Dr. Montilla staffed the patient.    Staff Involved:  Resident(Dario Phan)/Staff(as above)  I was present for the entire procedure. KB  .I, Marina Montilla MD, saw this patient with the resident and agree with the resident s findings and plan of care as documented in the resident s note.      Again, thank you for allowing me to participate in the care of your patient.      Sincerely,    Marina Montilla MD

## 2018-10-30 RX ORDER — TRIAMCINOLONE ACETONIDE 1 MG/G
CREAM TOPICAL 2 TIMES DAILY
Qty: 30 G | Refills: 3 | Status: SHIPPED | OUTPATIENT
Start: 2018-10-30 | End: 2018-12-20

## 2018-12-18 DIAGNOSIS — C61 MALIGNANT NEOPLASM OF PROSTATE (H): ICD-10-CM

## 2018-12-18 DIAGNOSIS — R73.02 IGT (IMPAIRED GLUCOSE TOLERANCE): Primary | ICD-10-CM

## 2018-12-18 LAB
ALBUMIN SERPL-MCNC: 3.5 G/DL (ref 3.4–5)
ALP SERPL-CCNC: 70 U/L (ref 40–150)
ALT SERPL W P-5'-P-CCNC: 22 U/L (ref 0–70)
ANION GAP SERPL CALCULATED.3IONS-SCNC: 9 MMOL/L (ref 3–14)
AST SERPL W P-5'-P-CCNC: 14 U/L (ref 0–45)
BASOPHILS # BLD AUTO: 0 10E9/L (ref 0–0.2)
BASOPHILS NFR BLD AUTO: 0.5 %
BILIRUB SERPL-MCNC: 0.5 MG/DL (ref 0.2–1.3)
BUN SERPL-MCNC: 24 MG/DL (ref 7–30)
CALCIUM SERPL-MCNC: 8.4 MG/DL (ref 8.5–10.1)
CHLORIDE SERPL-SCNC: 108 MMOL/L (ref 94–109)
CO2 SERPL-SCNC: 23 MMOL/L (ref 20–32)
CREAT SERPL-MCNC: 0.76 MG/DL (ref 0.66–1.25)
DIFFERENTIAL METHOD BLD: NORMAL
EOSINOPHIL # BLD AUTO: 0.1 10E9/L (ref 0–0.7)
EOSINOPHIL NFR BLD AUTO: 1.4 %
ERYTHROCYTE [DISTWIDTH] IN BLOOD BY AUTOMATED COUNT: 12.8 % (ref 10–15)
GFR SERPL CREATININE-BSD FRML MDRD: >90 ML/MIN/1.7M2
GLUCOSE SERPL-MCNC: 157 MG/DL (ref 70–99)
HCT VFR BLD AUTO: 43.5 % (ref 40–53)
HGB BLD-MCNC: 13.9 G/DL (ref 13.3–17.7)
IMM GRANULOCYTES # BLD: 0 10E9/L (ref 0–0.4)
IMM GRANULOCYTES NFR BLD: 0.3 %
LYMPHOCYTES # BLD AUTO: 1.1 10E9/L (ref 0.8–5.3)
LYMPHOCYTES NFR BLD AUTO: 19.1 %
MCH RBC QN AUTO: 30.5 PG (ref 26.5–33)
MCHC RBC AUTO-ENTMCNC: 32 G/DL (ref 31.5–36.5)
MCV RBC AUTO: 96 FL (ref 78–100)
MONOCYTES # BLD AUTO: 0.4 10E9/L (ref 0–1.3)
MONOCYTES NFR BLD AUTO: 6.8 %
NEUTROPHILS # BLD AUTO: 4.3 10E9/L (ref 1.6–8.3)
NEUTROPHILS NFR BLD AUTO: 71.9 %
NRBC # BLD AUTO: 0 10*3/UL
NRBC BLD AUTO-RTO: 0 /100
PLATELET # BLD AUTO: 192 10E9/L (ref 150–450)
POTASSIUM SERPL-SCNC: 4 MMOL/L (ref 3.4–5.3)
PROT SERPL-MCNC: 7.2 G/DL (ref 6.8–8.8)
PSA SERPL-MCNC: 0.3 UG/L (ref 0–4)
RBC # BLD AUTO: 4.55 10E12/L (ref 4.4–5.9)
SODIUM SERPL-SCNC: 140 MMOL/L (ref 133–144)
WBC # BLD AUTO: 5.9 10E9/L (ref 4–11)

## 2018-12-18 PROCEDURE — 84403 ASSAY OF TOTAL TESTOSTERONE: CPT | Performed by: INTERNAL MEDICINE

## 2018-12-18 PROCEDURE — 85025 COMPLETE CBC W/AUTO DIFF WBC: CPT | Performed by: INTERNAL MEDICINE

## 2018-12-18 PROCEDURE — 84153 ASSAY OF PSA TOTAL: CPT | Performed by: INTERNAL MEDICINE

## 2018-12-18 PROCEDURE — 80053 COMPREHEN METABOLIC PANEL: CPT | Performed by: INTERNAL MEDICINE

## 2018-12-19 ENCOUNTER — ONCOLOGY VISIT (OUTPATIENT)
Dept: ONCOLOGY | Facility: CLINIC | Age: 73
End: 2018-12-19
Attending: INTERNAL MEDICINE
Payer: MEDICARE

## 2018-12-19 VITALS
WEIGHT: 206 LBS | HEART RATE: 65 BPM | BODY MASS INDEX: 27.94 KG/M2 | DIASTOLIC BLOOD PRESSURE: 69 MMHG | TEMPERATURE: 97.5 F | OXYGEN SATURATION: 98 % | SYSTOLIC BLOOD PRESSURE: 123 MMHG

## 2018-12-19 DIAGNOSIS — C61 MALIGNANT NEOPLASM OF PROSTATE (H): ICD-10-CM

## 2018-12-19 LAB — TESTOST SERPL-MCNC: 157 NG/DL (ref 240–950)

## 2018-12-19 PROCEDURE — G0463 HOSPITAL OUTPT CLINIC VISIT: HCPCS | Mod: ZF

## 2018-12-19 PROCEDURE — 99213 OFFICE O/P EST LOW 20 MIN: CPT | Mod: ZP | Performed by: INTERNAL MEDICINE

## 2018-12-19 RX ORDER — TAMSULOSIN HYDROCHLORIDE 0.4 MG/1
CAPSULE ORAL
Qty: 90 CAPSULE | Refills: 3 | Status: SHIPPED | OUTPATIENT
Start: 2018-12-19 | End: 2018-12-20

## 2018-12-19 ASSESSMENT — PAIN SCALES - GENERAL: PAINLEVEL: NO PAIN (0)

## 2018-12-19 NOTE — PROGRESS NOTES
[facility]  HEMATOLOGY AND ONCOLOGY    FOLLOW-UP VISIT NOTE    PATIENT NAME: Jam Nice MRN # [id number]  DATE OF VISIT: Dec 19, 2018 YOB: 1945    REFERRING PROVIDER: Referred Self, MD  No address on file    CANCER TYPE: Prostate cancer  STAGE: M0 Stage II with biochemical PSA relapse post radiation therapy with concomitant hormones                                                    TREATMENT SUMMARY:  - Prostate biopsy in 2008 for rising PSA - 4.8; revealed [place] 3+3 disease  -  He received two injections of three-month Zoladex Depot (10/3/08 and 1/5/09) in prep for concurrent radiation therapy but opted for surveillance  - Rpt Prostate biopsy for rising PSA done on 10/13/10 revealed again [place] 3+3 disease  - He had repeat prostate biopsy for rising PSA on 8/26/14 which now revealed [place] 3+4 disease  - He was treated with ADT - Zoladex Depot and recieved 3 doses (10.8 mg) on October 8, 2014, January 9, 2015, and April 2, 2015, respectively.  - He underwent definitive radiation therapy from June 9, 2015 --- August 3, 2015 and received 7800 cGy in 39 fractions.  - He is being followed with PSA every 6 months since then     CURRENT INTERVENTIONS:  Observation    SUBJECTIVE   Jam Nice is being followed for prostate cancer with biochemical relapse.     He has no new complains. He continues to do well.       PAST MEDICAL HISTORY     Past Medical History:   Diagnosis Date     Basal cell carcinoma of skin      Hyperlipidemia     statin intolerant     Prostate cancer (H) 2012    S/P radiation rx         CURRENT OUTPATIENT MEDICATIONS     Current Outpatient Medications   Medication Sig     PSYLLIUM HUSK PO      tamsulosin (FLOMAX) 0.4 MG capsule TAKE 1 CAPSULE BY MOUTH EVERY DAY     red yeast rice 600 MG CAPS Take 1 capsule (600 mg) by mouth 2 times daily     triamcinolone (KENALOG) 0.1 % cream Apply topically 2 times daily (Patient not taking: Reported on 12/19/2018)      No current facility-administered medications for this visit.         ALLERGIES      Allergies   Allergen Reactions     Cats Cough and Itching     Simvastatin      Aches, low energy, not feeling well        REVIEW OF SYSTEMS   As above in the HPI, o/w complete 12-point ROS was negative.     PHYSICAL EXAM   /69   Pulse 65   Temp 97.5  F (36.4  C) (Oral)   Wt 93.4 kg (206 lb)   SpO2 98%   BMI 27.94 kg/m    GEN: NAD  HEENT: PERRL, EOMI, no icterus, injection or pallor. Oropharynx is clear.  LYMPHATICs: no cervical or supraclavicular lymphadenopathy; no other abn lymphadenopathy  PULMONARY: clear with good air entry bilaterally  CARDIOVASCULAR: regular, no murmurs, rubs, or gallops  GASTROINTESTINAL: soft, non-tender, non-distended, normal bowel sounds, no hepatosplenomegaly by percussion or palpation  MUSCULOSKELTAL: warm, well perfused, no edema  NEURO: awake, alert and oriented to time place and person, cranial nerves intact - II - XII, no focal neurologic deficits  SKIN: no rashes     LABORATORY AND IMAGING STUDIES     Recent Labs   Lab Test 12/18/18  1000 06/20/18  1111    142   POTASSIUM 4.0 4.8   CHLORIDE 108 109   CO2 23 27   ANIONGAP 9 6   BUN 24 22   CR 0.76 0.89   * 89   LUZ 8.4* 8.6     No results for input(s): MAG, PHOS in the last 64475 hours.  Recent Labs   Lab Test 12/18/18  1000 06/20/18  1111   WBC 5.9 5.6   HGB 13.9 14.6    183   MCV 96 97   NEUTROPHIL 71.9 73.1     Recent Labs   Lab Test 12/18/18  1000 06/20/18  1111   BILITOTAL 0.5 0.5   ALKPHOS 70 67   ALT 22 16   AST 14 12   ALBUMIN 3.5 3.9     Recent Labs   Lab Test 12/18/18  1000 06/20/18  1111   PSA 0.30 0.28   TESTOSTTOTAL 157* 223*         ASSESSMENT AND PLAN   1. Hormone sensitive prostate cancer - [place] 3+4     s/p definitive radiation therapy 7800 cGy in 39 fractions from 6/9-8/3/15    ADT with 3 doses from Oct 2014 through April 2015  2. ECOG PS 0  3. No medical comorbidities  4. Followed every 6 months  for surveillance      Bill is doing well. He has no new complains. He continues to do well. His PSA is unchanged from last evaluation 6 months ago. We will continue to follow up every 6 months and not intervene unless there is a significant rise in his PSA. We will chose intermittent androgen deprivation therapy after that.     He is hypocalcemic. He notes that he drinks a quart of milk daily. Suggested that he take vitamin D supplementations. We will follow the same in 6 months.     I have refilled his flomax.

## 2018-12-19 NOTE — NURSING NOTE
Oncology Rooming Note    December 19, 2018 11:08 AM   Jam Nice is a 73 year old male who presents for:    Chief Complaint   Patient presents with     Oncology Clinic Visit     Prostate CA; 6 month check     Initial Vitals: /69   Pulse 65   Temp 97.5  F (36.4  C) (Oral)   Wt 93.4 kg (206 lb)   SpO2 98%   BMI 27.94 kg/m   Estimated body mass index is 27.94 kg/m  as calculated from the following:    Height as of 6/20/18: 1.829 m (6').    Weight as of this encounter: 93.4 kg (206 lb). Body surface area is 2.18 meters squared.  No Pain (0) Comment: Data Unavailable   No LMP for male patient.  Allergies reviewed: Yes  Medications reviewed: Yes    Medications: Medication refills not needed today.  Pharmacy name entered into [employer]:    [id number] [place] [facility] - [place], [place] - [address] [facility] [address] - [address] AT [facility] & [facility]    Clinical concerns:      8 minutes for nursing intake (face to face time)     Eneida Devine CMA

## 2018-12-19 NOTE — LETTER
12/19/2018       RE: Jam Nice  [address]  [facility] [address]     Dear Colleague,    Thank you for referring your patient, Jam Nice, to the [facility] CANCER CLINIC. Please see a copy of my visit note below.    [facility]  HEMATOLOGY AND ONCOLOGY    FOLLOW-UP VISIT NOTE    PATIENT NAME: Jam Nice MRN # [id number]  DATE OF VISIT: Dec 19, 2018 YOB: 1945    REFERRING PROVIDER: Referred Self, MD  No address on file    CANCER TYPE: Prostate cancer  STAGE: M0 Stage II with biochemical PSA relapse post radiation therapy with concomitant hormones                                                    TREATMENT SUMMARY:  - Prostate biopsy in 2008 for rising PSA - 4.8; revealed [place] 3+3 disease  -  He received two injections of three-month Zoladex Depot (10/3/08 and 1/5/09) in prep for concurrent radiation therapy but opted for surveillance  - Rpt Prostate biopsy for rising PSA done on 10/13/10 revealed again [place] 3+3 disease  - He had repeat prostate biopsy for rising PSA on 8/26/14 which now revealed [place] 3+4 disease  - He was treated with ADT - Zoladex Depot and recieved 3 doses (10.8 mg) on October 8, 2014, January 9, 2015, and April 2, 2015, respectively.  - He underwent definitive radiation therapy from June 9, 2015 --- August 3, 2015 and received 7800 cGy in 39 fractions.  - He is being followed with PSA every 6 months since then     CURRENT INTERVENTIONS:  Observation    SUBJECTIVE   Jam Nice is being followed for prostate cancer with biochemical relapse.     He has no new complains. He continues to do well.       PAST MEDICAL HISTORY     Past Medical History:   Diagnosis Date     Basal cell carcinoma of skin      Hyperlipidemia     statin intolerant     Prostate cancer (H) 2012    S/P radiation rx         CURRENT OUTPATIENT MEDICATIONS     Current Outpatient Medications   Medication Sig     PSYLLIUM HUSK PO      tamsulosin (FLOMAX)  0.4 MG capsule TAKE 1 CAPSULE BY MOUTH EVERY DAY     red yeast rice 600 MG CAPS Take 1 capsule (600 mg) by mouth 2 times daily     triamcinolone (KENALOG) 0.1 % cream Apply topically 2 times daily (Patient not taking: Reported on 12/19/2018)     No current facility-administered medications for this visit.         ALLERGIES      Allergies   Allergen Reactions     Cats Cough and Itching     Simvastatin      Aches, low energy, not feeling well        REVIEW OF SYSTEMS   As above in the HPI, o/w complete 12-point ROS was negative.     PHYSICAL EXAM   /69   Pulse 65   Temp 97.5  F (36.4  C) (Oral)   Wt 93.4 kg (206 lb)   SpO2 98%   BMI 27.94 kg/m     GEN: NAD  HEENT: PERRL, EOMI, no icterus, injection or pallor. Oropharynx is clear.  LYMPHATICs: no cervical or supraclavicular lymphadenopathy; no other abn lymphadenopathy  PULMONARY: clear with good air entry bilaterally  CARDIOVASCULAR: regular, no murmurs, rubs, or gallops  GASTROINTESTINAL: soft, non-tender, non-distended, normal bowel sounds, no hepatosplenomegaly by percussion or palpation  MUSCULOSKELTAL: warm, well perfused, no edema  NEURO: awake, alert and oriented to time place and person, cranial nerves intact - II - XII, no focal neurologic deficits  SKIN: no rashes     LABORATORY AND IMAGING STUDIES     Recent Labs   Lab Test 12/18/18  1000 06/20/18  1111    142   POTASSIUM 4.0 4.8   CHLORIDE 108 109   CO2 23 27   ANIONGAP 9 6   BUN 24 22   CR 0.76 0.89   * 89   LUZ 8.4* 8.6     No results for input(s): MAG, PHOS in the last 17367 hours.  Recent Labs   Lab Test 12/18/18  1000 06/20/18  1111   WBC 5.9 5.6   HGB 13.9 14.6    183   MCV 96 97   NEUTROPHIL 71.9 73.1     Recent Labs   Lab Test 12/18/18  1000 06/20/18  1111   BILITOTAL 0.5 0.5   ALKPHOS 70 67   ALT 22 16   AST 14 12   ALBUMIN 3.5 3.9     Recent Labs   Lab Test 12/18/18  1000 06/20/18  1111   PSA 0.30 0.28   TESTOSTTOTAL 157* 223*         ASSESSMENT AND PLAN    1. Hormone sensitive prostate cancer - [place] 3+4     s/p definitive radiation therapy 7800 cGy in 39 fractions from 6/9-8/3/15    ADT with 3 doses from Oct 2014 through April 2015  2. ECOG PS 0  3. No medical comorbidities  4. Followed every 6 months for surveillance      Bill is doing well. He has no new complains. He continues to do well. His PSA is unchanged from last evaluation 6 months ago. We will continue to follow up every 6 months and not intervene unless there is a significant rise in his PSA. We will chose intermittent androgen deprivation therapy after that.     He is hypocalcemic. He notes that he drinks a quart of milk daily. Suggested that he take vitamin D supplementations. We will follow the same in 6 months.     I have refilled his flomax.       Franky Arroyo MD

## 2018-12-20 ENCOUNTER — MYC MEDICAL ADVICE (OUTPATIENT)
Dept: INTERNAL MEDICINE | Facility: CLINIC | Age: 73
End: 2018-12-20

## 2018-12-20 ENCOUNTER — TELEPHONE (OUTPATIENT)
Dept: DERMATOLOGY | Facility: CLINIC | Age: 73
End: 2018-12-20

## 2018-12-20 DIAGNOSIS — C61 MALIGNANT NEOPLASM OF PROSTATE (H): ICD-10-CM

## 2018-12-20 DIAGNOSIS — L40.9 PSORIASIS: ICD-10-CM

## 2018-12-20 RX ORDER — TAMSULOSIN HYDROCHLORIDE 0.4 MG/1
CAPSULE ORAL
Qty: 90 CAPSULE | Refills: 0 | Status: CANCELLED | OUTPATIENT
Start: 2018-12-20

## 2018-12-20 RX ORDER — TAMSULOSIN HYDROCHLORIDE 0.4 MG/1
CAPSULE ORAL
Qty: 90 CAPSULE | Refills: 3 | Status: SHIPPED | OUTPATIENT
Start: 2018-12-20 | End: 2020-01-09

## 2018-12-20 RX ORDER — TRIAMCINOLONE ACETONIDE 1 MG/G
CREAM TOPICAL 2 TIMES DAILY
Qty: 30 G | Refills: 3 | Status: SHIPPED | OUTPATIENT
Start: 2018-12-20 | End: 2020-08-12

## 2018-12-20 NOTE — TELEPHONE ENCOUNTER
Health Call Center    Phone Message    May a detailed message be left on voicemail: yes    Reason for Call: Medication Question or concern regarding medication   Prescription Clarification  Name of Medication: triamcinolone (KENALOG) 0.1 % cream   Prescribing Provider: Marina Montilla MD  Pharmacy: [facility]  What on the order needs clarification? Pt requests we refill Rx that Alexi refused and have it shipped to his home from [facility].  Please send script to [facility] with those instructions.          Action Taken: Message routed to:  [facility] dermatology

## 2018-12-20 NOTE — TELEPHONE ENCOUNTER
Last Clinic Visit: 10/29/2018  [facility] Dermatology    -triamcinolone PRN     Follow-up in one year.       Kathleen M Doege RN

## 2018-12-20 NOTE — TELEPHONE ENCOUNTER
Pt contacted triage department re: Flomax Rx Escribed by Dr. Arroyo 12/19. Reports Rx was sent to [facility] mail order, but they are refusing to fill and he would like to transfer Rx to [facility].     Writer contacted Connie at [facility] mail order to cancel Rx. Re-sent to [facility] as prescribed by Dr. Arroyo 12/19.    Rashmi Bingham RN   [facility]    
negative...

## 2019-01-09 DIAGNOSIS — R73.02 IGT (IMPAIRED GLUCOSE TOLERANCE): ICD-10-CM

## 2019-01-09 LAB
GLUCOSE SERPL-MCNC: 97 MG/DL (ref 70–99)
HBA1C MFR BLD: 5.3 % (ref 0–5.6)

## 2019-01-09 PROCEDURE — 82947 ASSAY GLUCOSE BLOOD QUANT: CPT | Performed by: INTERNAL MEDICINE

## 2019-01-09 PROCEDURE — 83036 HEMOGLOBIN GLYCOSYLATED A1C: CPT | Performed by: INTERNAL MEDICINE

## 2019-01-09 PROCEDURE — 36415 COLL VENOUS BLD VENIPUNCTURE: CPT | Performed by: INTERNAL MEDICINE

## 2019-06-18 DIAGNOSIS — C61 MALIGNANT NEOPLASM OF PROSTATE (H): ICD-10-CM

## 2019-06-18 PROCEDURE — 84153 ASSAY OF PSA TOTAL: CPT | Performed by: INTERNAL MEDICINE

## 2019-06-18 PROCEDURE — 36415 COLL VENOUS BLD VENIPUNCTURE: CPT | Performed by: INTERNAL MEDICINE

## 2019-06-19 ENCOUNTER — ONCOLOGY VISIT (OUTPATIENT)
Dept: ONCOLOGY | Facility: CLINIC | Age: 74
End: 2019-06-19
Attending: INTERNAL MEDICINE
Payer: MEDICARE

## 2019-06-19 VITALS
RESPIRATION RATE: 16 BRPM | DIASTOLIC BLOOD PRESSURE: 71 MMHG | WEIGHT: 207.5 LBS | HEART RATE: 74 BPM | SYSTOLIC BLOOD PRESSURE: 123 MMHG | OXYGEN SATURATION: 97 % | TEMPERATURE: 98.6 F | BODY MASS INDEX: 28.1 KG/M2 | HEIGHT: 72 IN

## 2019-06-19 DIAGNOSIS — C61 MALIGNANT NEOPLASM OF PROSTATE (H): Primary | ICD-10-CM

## 2019-06-19 LAB — PSA SERPL-MCNC: 0.42 UG/L (ref 0–4)

## 2019-06-19 PROCEDURE — G0463 HOSPITAL OUTPT CLINIC VISIT: HCPCS | Mod: ZF

## 2019-06-19 PROCEDURE — 99213 OFFICE O/P EST LOW 20 MIN: CPT | Mod: ZP | Performed by: INTERNAL MEDICINE

## 2019-06-19 ASSESSMENT — PAIN SCALES - GENERAL: PAINLEVEL: NO PAIN (0)

## 2019-06-19 ASSESSMENT — MIFFLIN-ST. JEOR: SCORE: 1724.34

## 2019-06-19 NOTE — NURSING NOTE
"Oncology Rooming Note    June 19, 2019 9:59 AM   Jam Nice is a 74 year old male who presents for:    Chief Complaint   Patient presents with     Oncology Clinic Visit     F/U Prostate Ca     Initial Vitals: /71 (BP Location: Right arm, Patient Position: Sitting, Cuff Size: Adult Regular)   Pulse 74   Temp 98.6  F (37  C) (Oral)   Resp 16   Ht 1.829 m (6' 0.01\")   Wt 94.1 kg (207 lb 8 oz)   SpO2 97%   BMI 28.14 kg/m   Estimated body mass index is 28.14 kg/m  as calculated from the following:    Height as of this encounter: 1.829 m (6' 0.01\").    Weight as of this encounter: 94.1 kg (207 lb 8 oz). Body surface area is 2.19 meters squared.  No Pain (0) Comment: Data Unavailable   No LMP for male patient.  Allergies reviewed: Yes  Medications reviewed: Yes    Medications: Medication refills not needed today.  Pharmacy name entered into [employer]:    [facility] [id number] [place] [facility] - [place], [place] - [address] [facility] [address] - [address] AT [facility] [id number] - [place], [place] - [address] [place] [address] AT [facility]    Clinical concerns: None, Dr Arroyo was NOT notified.      MIKE HERNADEZ LPN            "

## 2019-06-19 NOTE — PROGRESS NOTES
[facility]  HEMATOLOGY AND ONCOLOGY    FOLLOW-UP VISIT NOTE    PATIENT NAME: Jam Nice MRN # [id number]  DATE OF VISIT: Jun 19, 2019 YOB: 1945    REFERRING PROVIDER: Referred Self, MD  No address on file    CANCER TYPE: Prostate cancer  STAGE: M0 Stage II with biochemical PSA relapse post radiation therapy with concomitant hormones                                                    TREATMENT SUMMARY:  - Prostate biopsy in 2008 for rising PSA - 4.8; revealed Powell 3+3 disease  -  He received two injections of three-month Zoladex Depot (10/3/08 and 1/5/09) in prep for concurrent radiation therapy but opted for surveillance  - Rpt Prostate biopsy for rising PSA done on 10/13/10 revealed again Powell 3+3 disease  - He had repeat prostate biopsy for rising PSA on 8/26/14 which now revealed Cady 3+4 disease  - He was treated with ADT - Zoladex Depot and recieved 3 doses (10.8 mg) on October 8, 2014, January 9, 2015, and April 2, 2015, respectively.  - He underwent definitive radiation therapy from June 9, 2015 --- August 3, 2015 and received 7800 cGy in 39 fractions.  - He is being followed with PSA every 6 months since then     CURRENT INTERVENTIONS:  Observation    SUBJECTIVE   Jam Nice is being followed for prostate cancer with biochemical relapse.     He has no new complains. He continues to do well.       PAST MEDICAL HISTORY     Past Medical History:   Diagnosis Date     Basal cell carcinoma of skin      Hyperlipidemia     statin intolerant     Prostate cancer (H) 2012    S/P radiation rx         CURRENT OUTPATIENT MEDICATIONS     Current Outpatient Medications   Medication Sig     PSYLLIUM HUSK PO      tamsulosin (FLOMAX) 0.4 MG capsule TAKE 1 CAPSULE BY MOUTH EVERY DAY     triamcinolone (KENALOG) 0.1 % external cream Apply topically 2 times daily     No current facility-administered medications for this visit.         ALLERGIES      Allergies   Allergen Reactions  "    Cats Cough and Itching     Simvastatin      Aches, low energy, not feeling well        REVIEW OF SYSTEMS   As above in the HPI, o/w complete 12-point ROS was negative.     PHYSICAL EXAM   /71 (BP Location: Right arm, Patient Position: Sitting, Cuff Size: Adult Regular)   Pulse 74   Temp 98.6  F (37  C) (Oral)   Resp 16   Ht 1.829 m (6' 0.01\")   Wt 94.1 kg (207 lb 8 oz)   SpO2 97%   BMI 28.14 kg/m    GEN: NAD  HEENT: PERRL, EOMI, no icterus, injection or pallor. Oropharynx is clear.  LYMPHATICs: no cervical or supraclavicular lymphadenopathy; no other abn lymphadenopathy  PULMONARY: clear with good air entry bilaterally  CARDIOVASCULAR: regular, no murmurs, rubs, or gallops  GASTROINTESTINAL: soft, non-tender, non-distended, normal bowel sounds, no hepatosplenomegaly by percussion or palpation  MUSCULOSKELTAL: warm, well perfused, no edema  NEURO: awake, alert and oriented to time place and person, cranial nerves intact - II - XII, no focal neurologic deficits  SKIN: no rashes     LABORATORY AND IMAGING STUDIES     Recent Labs   Lab Test 01/09/19  0724 12/18/18  1000 06/20/18  1111   NA  --  140 142   POTASSIUM  --  4.0 4.8   CHLORIDE  --  108 109   CO2  --  23 27   ANIONGAP  --  9 6   BUN  --  24 22   CR  --  0.76 0.89   GLC 97 157* 89   LUZ  --  8.4* 8.6     No results for input(s): MAG, PHOS in the last 90600 hours.  Recent Labs   Lab Test 12/18/18  1000 06/20/18  1111   WBC 5.9 5.6   HGB 13.9 14.6    183   MCV 96 97   NEUTROPHIL 71.9 73.1     Recent Labs   Lab Test 12/18/18  1000 06/20/18  1111   BILITOTAL 0.5 0.5   ALKPHOS 70 67   ALT 22 16   AST 14 12   ALBUMIN 3.5 3.9       Recent Labs   Lab Test 12/18/18  1000 06/20/18  1111   PSA 0.30 0.28   TESTOSTTOTAL 157* 223*      ASSESSMENT AND PLAN   1. Hormone sensitive prostate cancer - Cady 3+4     s/p definitive radiation therapy 7800 cGy in 39 fractions from 6/9-8/3/15    ADT with 3 doses from Oct 2014 through April 2015  2. ECOG PS " 0  3. No medical comorbidities  4. Followed every 6 months for surveillance      Bill is doing well. He has no new complains. He continues to do well. He had labs done locally. Unfortunately the lab was not available at the time of his clinic visit. I followed up with the clinic where he had the lab drawn and unfortunately it had not been sent for reading yesterday. It has been sent this am and remains pending after waiting late in the day today. I will release the results by Hardy (have my nurse follow up on this).     I will see him in 6 months with PSA and testosterone. I explained him that irrespective of PSA value (unless it is dramatically different), we will not be acting on it at this time. We will continue to follow this. His last 2 PSA values had been stable.

## 2019-06-20 ENCOUNTER — PATIENT OUTREACH (OUTPATIENT)
Dept: ONCOLOGY | Facility: CLINIC | Age: 74
End: 2019-06-20

## 2019-06-20 NOTE — PROGRESS NOTES
Writer called patient to update him of his PSA result as it was not completed yesterday when he met with Dr. Arroyo.  Patient had his lab drawn at the [facility] on Tuesday 6/18 and the result was not completed until 4:28pm 6/19.  Patient was not available when writer called to provide results, his wife states he is at work and doesn't answer his cell phone.  Informed patient's wife the results would be available via Signdat.     Nimo Syed, RN BSN   [facility]  Nurse Coordinator

## 2019-07-16 NOTE — TELEPHONE ENCOUNTER
FUTURE VISIT INFORMATION      FUTURE VISIT INFORMATION:    Date: 8/20/19    Time: 8:15 am       Location: [facility]  REFERRAL INFORMATION:    Referring provider:  Dr. Joey Davis    Referring providers clinic:  [facility]    Reason for visit/diagnosis  Bilateral hearing loss, unspecified type    RECORDS REQUESTED FROM:       Clinic name Comments Records Status Imaging Status   [facility] Office Visit-8/28/18-Dr. Joey Davis [facility]    [facility] Office Visit-2/8/18, 8/15/16, 1/5/15, 9/30/13  Audiogram-2/8/18, 8/15/16, 1/5/15 Sent to scan                              Action    Action Taken 7/16/2019-11:36 AM-Faxed request for records to [facility].  Patient says he has paper records from there that he is bringing in but I am requesting also.  PB    7/18/2019 -7:31 AM Received records from [facility].  HOLLY

## 2019-07-30 ENCOUNTER — MYC MEDICAL ADVICE (OUTPATIENT)
Dept: INTERNAL MEDICINE | Facility: CLINIC | Age: 74
End: 2019-07-30

## 2019-08-08 ENCOUNTER — MYC MEDICAL ADVICE (OUTPATIENT)
Dept: INTERNAL MEDICINE | Facility: CLINIC | Age: 74
End: 2019-08-08

## 2019-08-14 DIAGNOSIS — H91.90 HEARING LOSS, UNSPECIFIED HEARING LOSS TYPE, UNSPECIFIED LATERALITY: Primary | ICD-10-CM

## 2019-08-15 ENCOUNTER — OFFICE VISIT (OUTPATIENT)
Dept: AUDIOLOGY | Facility: CLINIC | Age: 74
End: 2019-08-15
Payer: MEDICARE

## 2019-08-15 DIAGNOSIS — H93.13 TINNITUS OF BOTH EARS: ICD-10-CM

## 2019-08-15 DIAGNOSIS — H90.3 SENSORINEURAL HEARING LOSS, BILATERAL: Primary | ICD-10-CM

## 2019-08-15 DIAGNOSIS — H91.90 HEARING LOSS, UNSPECIFIED HEARING LOSS TYPE, UNSPECIFIED LATERALITY: ICD-10-CM

## 2019-08-15 NOTE — PROGRESS NOTES
AUDIOLOGY REPORT    SUBJECTIVE:  Jam Nice is a 74 year old male who was seen in the Audiology Clinic at the [facility] for audiologic evaluation, referred by Rick Nissen,M.D. The patient has been seen previously at an outside clinic on 2/8/18 for assessment and results indicated a bilateral high frequency sensorineural hearing loss. The patient reports bilateral tinnitus and an increased difficulty hearing in different theatres around the [facility]. The patient denies bilateral otalgia, bilateral drainage, bilateral aural fullness, and dizziness.  The patient notes difficulty with communication in a variety of listening situations.      OBJECTIVE:  Abuse Screening:  Do you feel unsafe at home or work/school? No  Do you feel threatened by someone? No  Does anyone try to keep you from having contact with others, or doing things outside of your home? No  Physical signs of abuse present? No     Fall Risk Screen:  1. Have you fallen two or more times in the past year? No  2. Have you fallen and had an injury in the past year? No    Timed Up and Go Score (in seconds): not tested  Is patient a fall risk? No  Referral initiated: No  Fall Risk Assessment Completed by Audiology    Otoscopic exam indicates ears are clear of cerumen bilaterally     Pure Tone Thresholds assessed using conventional audiometry with good reliability from 250-8000 Hz bilaterally using insert earphones and circumaural headphones     RIGHT:  normal sloping to severe sensorineural hearing loss    LEFT:    normal sloping to severe sensorineural hearing loss    Tympanogram:    RIGHT: normal eardrum mobility    LEFT:   normal eardrum mobility    Reflexes (reported by stimulus ear):  RIGHT: Ipsilateral is present at normal levels  RIGHT: Contralateral is present at normal levels  LEFT:   Ipsilateral is present at normal levels  LEFT:   Contralateral is present at normal levels    Speech Reception Threshold:    RIGHT: 20  dB HL    LEFT:   15 dB HL    Word Recognition Score:     RIGHT: 100% at 65 dB HL using NU-6 recorded word list.    LEFT:   100% at 65 dB HL using NU-6 recorded word list.      ASSESSMENT:     ICD-10-CM    1. Hearing loss, unspecified hearing loss type, unspecified laterality H91.90 AUDIOLOGY ADULT REFERRAL       Compared to patient's previous audiogram dated 2/18/18, hearing has remained stable. Today s results were discussed with the patient in detail.     PLAN:  Patient was counseled regarding hearing loss and impact on communication.  Patient is a good candidate for amplification at this time. It is recommended that the patient follow-up with Dr. Nissen.  It is recommended that he continue to monitor his hearing status every 1-2 years, sooner if concerns arise.  A trial with amplification was discussed, patient stated he would consider things and schedule an appointment later should he decide to discuss amplification further.  Please call this clinic with questions regarding these results or recommendations.        Smita Ritter, [place]  Audiologist  [place] License  #[id number]

## 2019-08-20 ENCOUNTER — PRE VISIT (OUTPATIENT)
Dept: OTOLARYNGOLOGY | Facility: CLINIC | Age: 74
End: 2019-08-20

## 2019-08-20 ENCOUNTER — OFFICE VISIT (OUTPATIENT)
Dept: OTOLARYNGOLOGY | Facility: CLINIC | Age: 74
End: 2019-08-20
Payer: MEDICARE

## 2019-08-20 VITALS
HEART RATE: 71 BPM | SYSTOLIC BLOOD PRESSURE: 143 MMHG | WEIGHT: 211 LBS | BODY MASS INDEX: 29.54 KG/M2 | HEIGHT: 71 IN | DIASTOLIC BLOOD PRESSURE: 73 MMHG

## 2019-08-20 DIAGNOSIS — H90.3 BILATERAL SENSORINEURAL HEARING LOSS: Primary | ICD-10-CM

## 2019-08-20 DIAGNOSIS — H61.23 EXCESSIVE CERUMEN IN BOTH EAR CANALS: ICD-10-CM

## 2019-08-20 DIAGNOSIS — H93.13 TINNITUS, BILATERAL: ICD-10-CM

## 2019-08-20 DIAGNOSIS — H61.21 IMPACTED CERUMEN OF RIGHT EAR: ICD-10-CM

## 2019-08-20 ASSESSMENT — PAIN SCALES - GENERAL: PAINLEVEL: NO PAIN (0)

## 2019-08-20 ASSESSMENT — MIFFLIN-ST. JEOR: SCORE: 1728.34

## 2019-08-20 NOTE — LETTER
8/20/2019       RE: Jam Nice  [address]  [place] [address]     Dear Colleague,    Thank you for referring your patient, Jam Nice, to the [facility] EAR NOSE AND THROAT at [facility]. Please see a copy of my visit note below.    Dear Joey Espino:    I had the pleasure of meeting Jam Nice in consultation today at the [facility] at your request.    CHIEF COMPLAINT: Hearing    HISTORY OF PRESENT ILLNESS: Patient is a 74-year-old in today for assessment of hearing.  He has had some issues, especially in noisy situations, with his wife and watching TV.  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  He feels his hearing is symmetrical.  He does have bilateral tinnitus he has had for years.  He denies any dizziness.  Further denies any dysphagia, hoarseness, facial paresthesias.  He has 2 siblings, one living, not aware of any hearing issues with them.  Mother did have significant hearing loss.    ALLERGIES:    Allergies   Allergen Reactions     Cats Cough and Itching     Simvastatin      Aches, low energy, not feeling well       HABITS: Social History    Substance and Sexual Activity      Alcohol use: Not Currently     History   Smoking Status     Former Smoker     Packs/day: 1.50     Years: 10.00     Types: Cigarettes     Start date: 1/1/1964     Quit date: 1/1/1985   Smokeless Tobacco     Never Used         PAST MEDICAL HISTORY: Please see today's intake form (for the remainder of the PMH) which I reviewed and signed.  Past Medical History:   Diagnosis Date     Basal cell carcinoma of skin      Hearing problem Aug 2015     History of radiation therapy 2015    Prostate     Hyperlipidemia     statin intolerant     Prostate cancer (H) 2012    S/P radiation rx     Reduced vision      Tinnitus     Ongoing       FAMILY HISTORY/SOCIAL HISTORY:   Family History   Problem  Relation Age of Onset     Alcoholism Mother          90     Cerebrovascular Disease Mother         Possible     Substance Abuse Mother      Migraines Mother      Stomach Problem Mother      Coronary Artery Disease Father          51 MI     Heart Disease Father          at age 59     Substance Abuse Father      Alcoholism Sister          55     Cancer Sister      Substance Abuse Sister      Substance Abuse Sister      Dementia Sister         Recently diagnosed      Social History     Socioeconomic History     Marital status:      Spouse name: Not on file     Number of children: Not on file     Years of education: Not on file     Highest education level: Not on file   Occupational History     Occupation: retired     Employer: [employer]   Social Needs     Financial resource strain: Not on file     Food insecurity:     Worry: Not on file     Inability: Not on file     Transportation needs:     Medical: Not on file     Non-medical: Not on file   Tobacco Use     Smoking status: Former Smoker     Packs/day: 1.50     Years: 10.00     Pack years: 15.00     Types: Cigarettes     Start date: 1964     Last attempt to quit: 1985     Years since quittin.6     Smokeless tobacco: Never Used   Substance and Sexual Activity     Alcohol use: Not Currently     Drug use: No     Sexual activity: Never   Lifestyle     Physical activity:     Days per week: Not on file     Minutes per session: Not on file     Stress: Not on file   Relationships     Social connections:     Talks on phone: Not on file     Gets together: Not on file     Attends Islam service: Not on file     Active member of club or organization: Not on file     Attends meetings of clubs or organizations: Not on file     Relationship status: Not on file     Intimate partner violence:     Fear of current or ex partner: Not on file     Emotionally abused: Not on file     Physically abused: Not on file     Forced sexual activity: Not on file    Other Topics Concern     Not on file   Social History Narrative     Not on file       REVIEW OF SYSTEMS: Patient Supplied Answers to Review of Systems   ENT ROS 8/15/2019   Ears, Nose, Throat Ringing/noise in ears   Musculoskeletal Sore or stiff joints       The remainder of the 10 point ROS is negative    PHYSICIAL EXAMINATION:  Constitutional: The patient was well-groomed and in no acute distress.   Skin: Warm and pink.  Psychiatric: The patient's affect was calm, cooperative, and appropriate.   Respiratory: Breathing comfortably without stridor or exertion of accessory muscles.  Eyes: Pupils were equal and reactive. Extraocular movement intact.   Head: Normocephalic and atraumatic. No lesions or scars.  Ears: Patient placed under the microscope for microscopic evaluation and cleaning of cerumen which was obscuring full visualization of both TMs. Under high power magnification, the right ear was examined and found to have impacted cerumen.  This was cleaned with curette, right angled hook, and alligator.  After cleaning, TM is fully visualized and has normal position with normal middle ear aeration. The left ear was then cleaned and inspected using microscope, instruments and similar techniques. After cleaning of excessive cerumen, the TM has normal position with normal aeration to middle ear.  Nose: Sinuses were nontender. Anterior rhinoscopy revealed midline septum and absence of purulence or polyps.  Oral Cavity: Normal tongue, floor of mouth, buccal mucosa, and palate. No lesions or masses on inspection or palpation. No abnormal lymph tissue in the oropharynx.   Neck: The parotid is soft without masses. Supple with normal laryngeal and tracheal landmarks.   Lymphatic: There is no palpable lymphadenopathy or other masses in the neck.   Neurologic: Alert and oriented x 3. Cranial nerves III-XI within normal limits. Voice quality normal.  Cerebellar Function Tests:  Grossly normal    Audiogram: Audiogram  performed shows a bilateral high-frequency sensorineural hearing loss above 2000 Hz.  100% discrimination levels in each ear.  He has normal type A tympanograms bilaterally.      IMPRESSION AND PLAN:   1. Bilateral sensorineural hearing loss: Discussed with him option for hearing aids.  Multiple questions answered.  He will pursue that at his discretion in place of location.  [facility].  Recommend follow-up in 2 years to monitor hearing, sooner if any increased problems or concerns.  Protect his ears from noise.  2. Bilateral tinnitus: Secondary to sensorineural hearing loss.  No treatment needed at this time, hearing aids best option for helping with tinnitus if problematic.  Monitor.  3. Excessive cerumen: Cleaned today, no further treatment needed, monitor.    Thank you very much for the opportunity to participate in the care of your patient.    Rick L Nissen MD          Again, thank you for allowing me to participate in the care of your patient.      Sincerely,    Rick L. Nissen, MD

## 2019-08-20 NOTE — PATIENT INSTRUCTIONS
1.  You were seen in the ENT Clinic today by Dr. Nissen.  If you have any questions or concerns after your appointment, please call [phone]. Press option #1 for scheduling related needs. Press option #3 for Nurse advice.    2.  Plan is to return to clinic in 2 years with an Audiogram and Tympanogram (hearing test) prior to appointment, or sooner with any concerns.       Kerry Sutton LPN  [facility]  [phone]

## 2019-08-20 NOTE — NURSING NOTE
"Chief Complaint   Patient presents with     Consult     hearing loss      Blood pressure (!) 143/73, pulse 71, height 1.81 m (5' 11.26\"), weight 95.7 kg (211 lb).    Gonzalez Sales LPN      "

## 2019-08-20 NOTE — PROGRESS NOTES
Dear Joey Espino:    I had the pleasure of meeting Jam Nice in consultation today at the [facility] at your request.    CHIEF COMPLAINT: Hearing    HISTORY OF PRESENT ILLNESS: Patient is a 74-year-old in today for assessment of hearing.  He has had some issues, especially in noisy situations, with his wife and watching TV.  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  He feels his hearing is symmetrical.  He does have bilateral tinnitus he has had for years.  He denies any dizziness.  Further denies any dysphagia, hoarseness, facial paresthesias.  He has 2 siblings, one living, not aware of any hearing issues with them.  Mother did have significant hearing loss.    ALLERGIES:    Allergies   Allergen Reactions     Cats Cough and Itching     Simvastatin      Aches, low energy, not feeling well       HABITS: Social History    Substance and Sexual Activity      Alcohol use: Not Currently     History   Smoking Status     Former Smoker     Packs/day: 1.50     Years: 10.00     Types: Cigarettes     Start date: 1964     Quit date: 1985   Smokeless Tobacco     Never Used         PAST MEDICAL HISTORY: Please see today's intake form (for the remainder of the PMH) which I reviewed and signed.  Past Medical History:   Diagnosis Date     Basal cell carcinoma of skin      Hearing problem Aug 2015     History of radiation therapy     Prostate     Hyperlipidemia     statin intolerant     Prostate cancer (H)     S/P radiation rx     Reduced vision      Tinnitus     Ongoing       FAMILY HISTORY/SOCIAL HISTORY:   Family History   Problem Relation Age of Onset     Alcoholism Mother          90     Cerebrovascular Disease Mother         Possible     Substance Abuse Mother      Migraines Mother      Stomach Problem Mother      Coronary Artery Disease Father          51 MI     Heart Disease Father          at age 59      Substance Abuse Father      Alcoholism Sister          55     Cancer Sister      Substance Abuse Sister      Substance Abuse Sister      Dementia Sister         Recently diagnosed      Social History     Socioeconomic History     Marital status:      Spouse name: Not on file     Number of children: Not on file     Years of education: Not on file     Highest education level: Not on file   Occupational History     Occupation: retired     Employer: [employer]   Social Needs     Financial resource strain: Not on file     Food insecurity:     Worry: Not on file     Inability: Not on file     Transportation needs:     Medical: Not on file     Non-medical: Not on file   Tobacco Use     Smoking status: Former Smoker     Packs/day: 1.50     Years: 10.00     Pack years: 15.00     Types: Cigarettes     Start date: 1964     Last attempt to quit: 1985     Years since quittin.6     Smokeless tobacco: Never Used   Substance and Sexual Activity     Alcohol use: Not Currently     Drug use: No     Sexual activity: Never   Lifestyle     Physical activity:     Days per week: Not on file     Minutes per session: Not on file     Stress: Not on file   Relationships     Social connections:     Talks on phone: Not on file     Gets together: Not on file     Attends Gnosticist service: Not on file     Active member of club or organization: Not on file     Attends meetings of clubs or organizations: Not on file     Relationship status: Not on file     Intimate partner violence:     Fear of current or ex partner: Not on file     Emotionally abused: Not on file     Physically abused: Not on file     Forced sexual activity: Not on file   Other Topics Concern     Not on file   Social History Narrative     Not on file       REVIEW OF SYSTEMS: Patient Supplied Answers to Review of Systems   ENT ROS 8/15/2019   Ears, Nose, Throat Ringing/noise in ears   Musculoskeletal Sore or stiff joints       The remainder of the 10 point ROS  is negative    PHYSICIAL EXAMINATION:  Constitutional: The patient was well-groomed and in no acute distress.   Skin: Warm and pink.  Psychiatric: The patient's affect was calm, cooperative, and appropriate.   Respiratory: Breathing comfortably without stridor or exertion of accessory muscles.  Eyes: Pupils were equal and reactive. Extraocular movement intact.   Head: Normocephalic and atraumatic. No lesions or scars.  Ears: Patient placed under the microscope for microscopic evaluation and cleaning of cerumen which was obscuring full visualization of both TMs. Under high power magnification, the right ear was examined and found to have impacted cerumen.  This was cleaned with curette, right angled hook, and alligator.  After cleaning, TM is fully visualized and has normal position with normal middle ear aeration. The left ear was then cleaned and inspected using microscope, instruments and similar techniques. After cleaning of excessive cerumen, the TM has normal position with normal aeration to middle ear.  Nose: Sinuses were nontender. Anterior rhinoscopy revealed midline septum and absence of purulence or polyps.  Oral Cavity: Normal tongue, floor of mouth, buccal mucosa, and palate. No lesions or masses on inspection or palpation. No abnormal lymph tissue in the oropharynx.   Neck: The parotid is soft without masses. Supple with normal laryngeal and tracheal landmarks.   Lymphatic: There is no palpable lymphadenopathy or other masses in the neck.   Neurologic: Alert and oriented x 3. Cranial nerves III-XI within normal limits. Voice quality normal.  Cerebellar Function Tests:  Grossly normal    Audiogram: Audiogram performed shows a bilateral high-frequency sensorineural hearing loss above 2000 Hz.  100% discrimination levels in each ear.  He has normal type A tympanograms bilaterally.      IMPRESSION AND PLAN:   1. Bilateral sensorineural hearing loss: Discussed with him option for hearing aids.  Multiple  questions answered.  He will pursue that at his discretion in place of location.  [facility]  Recommend follow-up in 2 years to monitor hearing, sooner if any increased problems or concerns.  Protect his ears from noise.  2. Bilateral tinnitus: Secondary to sensorineural hearing loss.  No treatment needed at this time, hearing aids best option for helping with tinnitus if problematic.  Monitor.  3. Excessive cerumen: Cleaned today, no further treatment needed, monitor.    Thank you very much for the opportunity to participate in the care of your patient.    Rick L Nissen MD

## 2019-08-30 ENCOUNTER — MYC MEDICAL ADVICE (OUTPATIENT)
Dept: INTERNAL MEDICINE | Facility: CLINIC | Age: 74
End: 2019-08-30

## 2019-09-13 ENCOUNTER — OFFICE VISIT (OUTPATIENT)
Dept: INTERNAL MEDICINE | Facility: CLINIC | Age: 74
End: 2019-09-13
Payer: MEDICARE

## 2019-09-13 VITALS
HEART RATE: 78 BPM | OXYGEN SATURATION: 95 % | DIASTOLIC BLOOD PRESSURE: 65 MMHG | BODY MASS INDEX: 28.8 KG/M2 | WEIGHT: 208 LBS | SYSTOLIC BLOOD PRESSURE: 113 MMHG | RESPIRATION RATE: 16 BRPM

## 2019-09-13 DIAGNOSIS — Z00.00 ENCOUNTER FOR MEDICARE ANNUAL WELLNESS EXAM: Primary | ICD-10-CM

## 2019-09-13 DIAGNOSIS — C61 MALIGNANT NEOPLASM OF PROSTATE (H): ICD-10-CM

## 2019-09-13 DIAGNOSIS — Z87.891 FORMER SMOKER: ICD-10-CM

## 2019-09-13 DIAGNOSIS — E78.5 HYPERLIPIDEMIA, UNSPECIFIED HYPERLIPIDEMIA TYPE: ICD-10-CM

## 2019-09-13 ASSESSMENT — PAIN SCALES - GENERAL: PAINLEVEL: NO PAIN (0)

## 2019-09-13 NOTE — NURSING NOTE
Chief Complaint   Patient presents with     Medicare Visit     Pt is here for medicare annual wellness.      Kathy Le LPN at 12:42 PM on 9/13/2019.

## 2019-09-13 NOTE — PATIENT INSTRUCTIONS
[facility] Medication Refill Request Information:  * Please contact your pharmacy regarding ANY request for medication refills.  ** [facility] Prescription Fax = [phone]  * Please allow 3 business days for routine medication refills.  * Please allow 5 business days for controlled substance medication refills.     [facility] Test Result notification information:  *You will be notified with in 7-10 days of your appointment day regarding the results of your test.  If you are on MyChart you will be notified as soon as the provider has reviewed the results and signed off on them.    [facility]: [phone]     Personalized Prevention Plan  You are due for the preventive services outlined below.  Your care team is available to assist you in scheduling these services.  If you have already completed any of these items, please share that information with your care team to update in your medical record.  Health Maintenance Due   Topic Date Due     Zoster (Shingles) Vaccine (2 of 3) 03/11/2008     Diptheria Tetanus Pertussis (DTAP/TDAP/TD) Vaccine (1 - Tdap) 11/10/2017     Flu Vaccine (1) 09/01/2019     Annual Wellness Visit  08/28/2019

## 2019-09-13 NOTE — PROGRESS NOTES
Medicare Annual Wellness Visit         HPI     This 74 year old male presents as an established patient   who presents for an subsequent Medicare Wellness Exam.  Patient also reports that he is been feeling well no specific complaints or concerns today is here just for the wellness review.         Patient Active Problem List   Diagnosis     Malignant neoplasm of prostate (H)     ACP (advance care planning)       Past Medical History:   Diagnosis Date     Basal cell carcinoma of skin      Hearing problem Aug 2015     History of radiation therapy     Prostate     Hyperlipidemia     statin intolerant     Prostate cancer (H)     S/P radiation rx     Reduced vision      Tinnitus     Ongoing        Family History   Problem Relation Age of Onset     Alcoholism Mother          90     Cerebrovascular Disease Mother         Possible     Substance Abuse Mother      Migraines Mother      Stomach Problem Mother      Coronary Artery Disease Father          51 MI     Heart Disease Father          at age 59     Substance Abuse Father      Alcoholism Sister          55     Cancer Sister      Substance Abuse Sister      Substance Abuse Sister      Dementia Sister         Recently diagnosed         Past Surgical History:   Procedure Laterality Date     .menisectomy Left 1967     HERNIA REPAIR       ORTHOPEDIC SURGERY         Reviewed no other significant FH    Family History and past Medical History reviewed and it is unchanged/updated.       Review of Systems       Constitutional:   fevers, night sweats or unintentional weight change ?  NO      Eyes:   vision change, diplopia or red eyes?  NO      Ears, Nose, Mouth, Throat:   tinnitus or hearing change,  epistaxis or nasal discharge,  oral lesions, throat pain ?  NO      Neck:   stiffness?  NO           Cardiovascular:   chest pain, palpitations, or pain with walking, orthopnea or PND?  NO   Breasts:  Any bumps or unusual discharge?     NO          Respiratory:   dyspnea, cough, shortness of breath or wheezing?  NO         GI:   nausea, vomiting, diarrhea or constipation,  abdominal pain ?  NO         :   change in urine,  dysuria or hematuria,  sexual dysfunction ?  NO        Musculoskeletal:   joint or muscle pain or swelling?  NO            Skin:   concerning lesions or moles?  NO           Nervous System:   loss of strength or sensation,  numbness or tingling,  tremor,  dizziness,  headache?  NO   Endocrine/Homone:   polyuria or polydipsia,  temperature intolerance?  NO            Blood and Lymphnodes:   concerning bumps,  bleeding problems?  NO            Allergy:   environmental allergies?  NO            Mental Health:   depression or anxiety,  sleep problems?  NO               Medical Care     Have you been to an ER or a hospital in the last year? No  What other specialists or organizations are involved in your medical care?  See below:  Current providers sharing in care for this patient include:  Patient Care Team:  Joey Davis MD as PCP - General (Internal Medicine)  Marina Montilla MD as MD (Dermatology)  Nimo Syed RN as Nurse Coordinator (Oncology)  Franky Arroyo MD as MD (Oncology)         Social History     Social History     Tobacco Use     Smoking status: Former Smoker     Packs/day: 1.50     Years: 10.00     Pack years: 15.00     Types: Cigarettes     Start date: 1964     Last attempt to quit: 1985     Years since quittin.7     Smokeless tobacco: Never Used   Substance Use Topics     Alcohol use: Not Currently     Marital Status:  Who lives in your household? Wife and dog  Does your home have any of the following safety concerns? Loose rugs in the hallway, no grab bars in the bathroom, no handrails on the stairs or have poorly lit areas?  No  Do you feel threatened or controlled by a partner, ex-partner or anyone in your life? No  Has anyone hurt you physically, for example by  pushing, hitting, slapping or kicking you   or forcing you to have sex? No  Do you need help with the phone, transportation, shopping, preparing meals, housework, laundry, medications or managing money? No   Have you noticed any hearing difficulties? Yes high frequency loss no aids      Risk Behaviors and Healthy Habits     How many servings of fruits and vegetables do you eat a day? 3  How often do you exercise and what do you do? 60 minutes 7 a week  Do you frequently ride without a seatbelt? No  Do you use tobacco?  No  Do you use any other drugs? No         Do you use alcohol?No  Yes  Number of drinks per day 0  Number of drinking days a week 0    Today's PHQ-2 Score:  0     Sexual Health     Are you sexually active?  No       FUNCTIONAL ABILITY/SAFETY SCREENING     Fall Risk Assessment Today: Fallen 2 or more times in the past year?: No  Any fall with injury in the past year?: No    Hearing evaluation if done: Yes as above    EVALUATION OF COGNITIVE FUNCTION     Mood/affect:Normal  Appearance:Normal  Family member/caregiver input: none  .  Mini Cog Scoring   2 points .  Clock Draw Test result:  Normal  .    SCREENING FOR PREVENTION and EARLY DETECTION     ECG (if done)not performed    Corrected Visual acuity: L 20/20   R 20/20    US for AAA (65-76 yo+ever smoked):  Recommended and patient accepted testing. and will schedule    CV Risk based on Pooled Cohort Risk:  The ASCVD Risk score (Silvestre DC Jr., et al., 2013) failed to calculate for the following reasons:    Cannot find a previous HDL lab    Cannot find a previous total cholesterol lab  .    Advanced Directives: Discussed and patient desires to be full code.      Immunization History   Administered Date(s) Administered     Influenza (High Dose) 3 valent vaccine 10/29/2018     Pneumo Conj 13-V (2010&after) 08/28/2018     Pneumococcal 23 valent 03/02/2011, 11/30/2015     TDAP Vaccine (Boostrix) 11/09/2017     Tetanus 11/09/2017       Reviewed Immunization  Record Today  Pneumoccocal Vaccine: Yes  Varicella Vaccine: Yes  TDaP:Yes         Physical Exam     Vitals: /65   Pulse 78   Resp 16   Wt 94.3 kg (208 lb)   SpO2 95%   BMI 28.80 kg/m    BMI= Body mass index is 28.8 kg/m .          Assessment and Plan   subsequent   Medicare Wellness Exam  Jam was seen today for medicare visit.    Diagnoses and all orders for this visit:    Encounter for Medicare annual wellness exam  -     Lipid Profile; Future    Malignant neoplasm of prostate (H)    Hyperlipidemia, unspecified hyperlipidemia type    Former smoker  -     US Abdominal Aorta Imaging; Future        Jam was seen today for medicare visit.    Diagnoses and all orders for this visit:    Encounter for Medicare annual wellness exam          Options for treatment and follow-up care were reviewed with the Jam LERMA [place] and/or guardian engaged in the decision making process and verbalized understanding of the options discussed and agreed with the final plan.    CHAPINCITO QUINTERO

## 2019-11-08 ENCOUNTER — HEALTH MAINTENANCE LETTER (OUTPATIENT)
Age: 74
End: 2019-11-08

## 2019-11-12 ENCOUNTER — MYC MEDICAL ADVICE (OUTPATIENT)
Dept: INTERNAL MEDICINE | Facility: CLINIC | Age: 74
End: 2019-11-12

## 2019-12-04 ENCOUNTER — PATIENT OUTREACH (OUTPATIENT)
Dept: ONCOLOGY | Facility: CLINIC | Age: 74
End: 2019-12-04

## 2019-12-04 NOTE — PROGRESS NOTES
Oncology RN Care Coordination Note:     Patient returned writer's call and states today's appointment wouldn't be fruitful because he has labs that wouldn't be done and there would be nothing to talk about at the appointment.  Writer offered to release labs via [place] as that's how his last appointment was.  Patient states he would rather not do that.     Writer reviewed Dr. Arroyo's schedule for the next couple of weeks and found multiple openings at [facility] on 1/9/2020 and patient would like to see him there at 8:30am.     Writer will send a message to scheduling to get this switched.    Nimo Syed, RN BSN   [facility]  Nurse Coordinator

## 2019-12-04 NOTE — PROGRESS NOTES
"Oncology RN Care Coordination Note:     Writer called patient in regards to his outburst with a clinic coordinator yesterday regarding rescheduling his upcoming 6 month follow up with Dr. Arroyo in January 2020.      Mr. Nice was scheduled to see Dr. Arroyo on 1/8/2020 however Dr. Arroyo will not be in clinic and he has an opening on 1/14/2020, however when Andria OLIVO clinic coordinator was attempting to offer this appointment change Mr. Nice continued to yell at her \"your making medical decisions\" and eventually hung up the phone.      has placed a HOLD on an appointment with Dr. Arroyo this afternoon at 4:00pm for Mr. Nice.  Per Dr. Arroyo's last notes he would need a lab appointment for PSA and testosterone, no scans needed at this time.      Dipikar is waiting for patient to return call to complete the appointment for today.    Nimo Syed, RN BSN   [facility]  Nurse Coordinator        "

## 2019-12-09 ENCOUNTER — ANCILLARY PROCEDURE (OUTPATIENT)
Dept: ULTRASOUND IMAGING | Facility: CLINIC | Age: 74
End: 2019-12-09
Attending: INTERNAL MEDICINE
Payer: MEDICARE

## 2019-12-09 ENCOUNTER — MYC MEDICAL ADVICE (OUTPATIENT)
Dept: INTERNAL MEDICINE | Facility: CLINIC | Age: 74
End: 2019-12-09

## 2019-12-09 DIAGNOSIS — C61 MALIGNANT NEOPLASM OF PROSTATE (H): ICD-10-CM

## 2019-12-09 DIAGNOSIS — Z87.891 FORMER SMOKER: ICD-10-CM

## 2019-12-09 DIAGNOSIS — E78.5 HYPERLIPIDEMIA, UNSPECIFIED HYPERLIPIDEMIA TYPE: ICD-10-CM

## 2019-12-09 LAB
ALBUMIN SERPL-MCNC: 3.8 G/DL (ref 3.4–5)
ALP SERPL-CCNC: 70 U/L (ref 40–150)
ALT SERPL W P-5'-P-CCNC: 22 U/L (ref 0–70)
ANION GAP SERPL CALCULATED.3IONS-SCNC: 2 MMOL/L (ref 3–14)
AST SERPL W P-5'-P-CCNC: 11 U/L (ref 0–45)
BILIRUB SERPL-MCNC: 0.4 MG/DL (ref 0.2–1.3)
BUN SERPL-MCNC: 23 MG/DL (ref 7–30)
CALCIUM SERPL-MCNC: 9.1 MG/DL (ref 8.5–10.1)
CHLORIDE SERPL-SCNC: 110 MMOL/L (ref 94–109)
CHOLEST SERPL-MCNC: 198 MG/DL
CO2 SERPL-SCNC: 29 MMOL/L (ref 20–32)
CREAT SERPL-MCNC: 0.93 MG/DL (ref 0.66–1.25)
ERYTHROCYTE [DISTWIDTH] IN BLOOD BY AUTOMATED COUNT: 13.2 % (ref 10–15)
GFR SERPL CREATININE-BSD FRML MDRD: 81 ML/MIN/{1.73_M2}
GLUCOSE SERPL-MCNC: 101 MG/DL (ref 70–99)
HCT VFR BLD AUTO: 45 % (ref 40–53)
HDLC SERPL-MCNC: 52 MG/DL
HGB BLD-MCNC: 14.2 G/DL (ref 13.3–17.7)
LDLC SERPL CALC-MCNC: 120 MG/DL
MCH RBC QN AUTO: 31.5 PG (ref 26.5–33)
MCHC RBC AUTO-ENTMCNC: 31.6 G/DL (ref 31.5–36.5)
MCV RBC AUTO: 100 FL (ref 78–100)
NONHDLC SERPL-MCNC: 146 MG/DL
PLATELET # BLD AUTO: 209 10E9/L (ref 150–450)
POTASSIUM SERPL-SCNC: 4.5 MMOL/L (ref 3.4–5.3)
PROT SERPL-MCNC: 7.2 G/DL (ref 6.8–8.8)
PSA SERPL-MCNC: 0.4 UG/L (ref 0–4)
RBC # BLD AUTO: 4.51 10E12/L (ref 4.4–5.9)
SODIUM SERPL-SCNC: 141 MMOL/L (ref 133–144)
TRIGL SERPL-MCNC: 128 MG/DL
WBC # BLD AUTO: 4.9 10E9/L (ref 4–11)

## 2019-12-09 PROCEDURE — 84270 ASSAY OF SEX HORMONE GLOBUL: CPT | Performed by: INTERNAL MEDICINE

## 2019-12-09 PROCEDURE — 84403 ASSAY OF TOTAL TESTOSTERONE: CPT | Performed by: INTERNAL MEDICINE

## 2019-12-11 LAB
SHBG SERPL-SCNC: 45 NMOL/L (ref 11–80)
TESTOST FREE SERPL-MCNC: 3.15 NG/DL (ref 4.7–24.4)
TESTOST SERPL-MCNC: 202 NG/DL (ref 240–950)

## 2019-12-12 ENCOUNTER — MYC MEDICAL ADVICE (OUTPATIENT)
Dept: INTERNAL MEDICINE | Facility: CLINIC | Age: 74
End: 2019-12-12

## 2020-01-09 ENCOUNTER — ONCOLOGY VISIT (OUTPATIENT)
Dept: ONCOLOGY | Facility: CLINIC | Age: 75
End: 2020-01-09
Attending: INTERNAL MEDICINE
Payer: MEDICARE

## 2020-01-09 VITALS
RESPIRATION RATE: 18 BRPM | BODY MASS INDEX: 28.61 KG/M2 | WEIGHT: 211.2 LBS | TEMPERATURE: 98.6 F | HEIGHT: 72 IN | DIASTOLIC BLOOD PRESSURE: 74 MMHG | SYSTOLIC BLOOD PRESSURE: 123 MMHG | OXYGEN SATURATION: 98 % | HEART RATE: 61 BPM

## 2020-01-09 DIAGNOSIS — C61 MALIGNANT NEOPLASM OF PROSTATE (H): ICD-10-CM

## 2020-01-09 PROCEDURE — G0463 HOSPITAL OUTPT CLINIC VISIT: HCPCS

## 2020-01-09 PROCEDURE — 99213 OFFICE O/P EST LOW 20 MIN: CPT | Performed by: INTERNAL MEDICINE

## 2020-01-09 RX ORDER — TAMSULOSIN HYDROCHLORIDE 0.4 MG/1
CAPSULE ORAL
Qty: 90 CAPSULE | Refills: 3 | Status: SHIPPED | OUTPATIENT
Start: 2020-01-09 | End: 2020-01-17

## 2020-01-09 ASSESSMENT — PAIN SCALES - GENERAL: PAINLEVEL: NO PAIN (0)

## 2020-01-09 ASSESSMENT — MIFFLIN-ST. JEOR: SCORE: 1741

## 2020-01-09 NOTE — PROGRESS NOTES
Oncology Rooming Note    January 9, 2020 8:24 AM   Jam Nice is a 74 year old male who presents for:    Chief Complaint   Patient presents with     Oncology Clinic Visit     Malignant neoplasm of prostate (H)     Initial Vitals: /74 (BP Location: Left arm, Patient Position: Sitting, Cuff Size: Adult Large)   Pulse 61   Temp 98.6  F (37  C) (Oral)   Resp 18   Ht 1.829 m (6')   Wt 95.8 kg (211 lb 3.2 oz)   SpO2 98%   BMI 28.64 kg/m   Estimated body mass index is 28.64 kg/m  as calculated from the following:    Height as of this encounter: 1.829 m (6').    Weight as of this encounter: 95.8 kg (211 lb 3.2 oz). Body surface area is 2.21 meters squared.  No Pain (0) Comment: Data Unavailable   No LMP for male patient.  Allergies reviewed: Yes  Medications reviewed: Yes    Medications: MEDICATION REFILLS NEEDED TODAY. Provider was notified. Refill FLOMAX  Pharmacy name entered into [facility]:    [facility] [place], [place] - [id number]  [facility] - [place], [place] - [address]     Clinical concerns: no        Leah Amezcua CMA

## 2020-01-09 NOTE — PROGRESS NOTES
[facility]  HEMATOLOGY AND ONCOLOGY    FOLLOW-UP VISIT NOTE    PATIENT NAME: Jam Nice MRN # [id number]  DATE OF VISIT: Jan 9, 2020 YOB: 1945    REFERRING PROVIDER: Referred Self, MD  No address on file    CANCER TYPE: Prostate cancer  STAGE: M0 Stage II with biochemical PSA relapse post radiation therapy with concomitant hormones                                                    TREATMENT SUMMARY:  - Prostate biopsy in 2008 for rising PSA - 4.8; revealed [place] 3+3 disease  -  He received two injections of three-month Zoladex Depot (10/3/08 and 1/5/09) in prep for concurrent radiation therapy but opted for surveillance  - Rpt Prostate biopsy for rising PSA done on 10/13/10 revealed again [place] 3+3 disease  - He had repeat prostate biopsy for rising PSA on 8/26/14 which now revealed Cad[place] 3+4 disease  - He was treated with ADT - Zoladex Depot and recieved 3 doses (10.8 mg) on October 8, 2014, January 9, 2015, and April 2, 2015, respectively.  - He underwent definitive radiation therapy from June 9, 2015 --- August 3, 2015 and received 7800 cGy in 39 fractions.  - He is being followed with PSA every 6 months since then     CURRENT INTERVENTIONS:  Observation    SUBJECTIVE   Jam Nice is being followed for prostate cancer with biochemical relapse.     He has no new complains. He continues to do well. He had a quite [place].       PAST MEDICAL HISTORY     Past Medical History:   Diagnosis Date     Basal cell carcinoma of skin      Hearing problem Aug 2015     History of radiation therapy 2015    Prostate     Hyperlipidemia     statin intolerant     Prostate cancer (H) 2012    S/P radiation rx     Reduced vision      Tinnitus     Ongoing         CURRENT OUTPATIENT MEDICATIONS     Current Outpatient Medications   Medication Sig     PSYLLIUM HUSK PO      tamsulosin (FLOMAX) 0.4 MG capsule TAKE 1 CAPSULE BY MOUTH EVERY DAY     triamcinolone (KENALOG) 0.1 % external cream  Apply topically 2 times daily     No current facility-administered medications for this visit.         ALLERGIES      Allergies   Allergen Reactions     Cats Cough and Itching     Simvastatin      Aches, low energy, not feeling well        REVIEW OF SYSTEMS   As above in the HPI, o/w complete 12-point ROS was negative.     PHYSICAL EXAM   /74 (BP Location: Left arm, Patient Position: Sitting, Cuff Size: Adult Large)   Pulse 61   Temp 98.6  F (37  C) (Oral)   Resp 18   Ht 1.829 m (6')   Wt 95.8 kg (211 lb 3.2 oz)   SpO2 98%   BMI 28.64 kg/m    GEN: NAD  HEENT: PERRL, EOMI, no icterus, injection or pallor. Oropharynx is clear.  LYMPHATICs: no cervical or supraclavicular lymphadenopathy; no other abn lymphadenopathy  PULMONARY: clear with good air entry bilaterally  CARDIOVASCULAR: regular, no murmurs, rubs, or gallops  GASTROINTESTINAL: soft, non-tender, non-distended, normal bowel sounds, no hepatosplenomegaly by percussion or palpation  MUSCULOSKELTAL: warm, well perfused, no edema  NEURO: awake, alert and oriented to time place and person, cranial nerves intact - II - XII, no focal neurologic deficits  SKIN: no rashes     LABORATORY AND IMAGING STUDIES     Recent Labs   Lab Test 12/09/19  0704 01/09/19  0724 12/18/18  1000 06/20/18  1111     --  140 142   POTASSIUM 4.5  --  4.0 4.8   CHLORIDE 110*  --  108 109   CO2 29  --  23 27   ANIONGAP 2*  --  9 6   BUN 23  --  24 22   CR 0.93  --  0.76 0.89   * 97 157* 89   LUZ 9.1  --  8.4* 8.6     No results for input(s): MAG, PHOS in the last 86534 hours.  Recent Labs   Lab Test 12/09/19  0704 12/18/18  1000 06/20/18  1111   WBC 4.9 5.9 5.6   HGB 14.2 13.9 14.6    192 183    96 97   NEUTROPHIL  --  71.9 73.1     Recent Labs   Lab Test 12/09/19  0704 12/18/18  1000 06/20/18  1111   BILITOTAL 0.4 0.5 0.5   ALKPHOS 70 70 67   ALT 22 22 16   AST 11 14 12   ALBUMIN 3.8 3.5 3.9     No results found for: TSH  No results for input(s): CEA in  the last 39016 hours.  Results for orders placed or performed in visit on 12/09/19   US Abdominal Aorta Imaging    Narrative    EXAMINATION: US ABDOMINAL AORTA LIMITED, 12/9/2019 7:36 AM     COMPARISON: None.    HISTORY: Former smoker    TECHNIQUE: Gray-scale and color Doppler ultrasound of the abdominal  aorta.    FINDINGS: Examination of the abdominal aorta is performed.    Proximal abdominal aorta: 2.0 x 2.0 cm.    Mid abdominal aorta: 1.9 x 1.6 cm.    Distal abdominal aorta: 1.7 x 1.6 cm.    Proximal right common iliac artery is normal in caliber measuring 1.2  cm in maximal diameter.    Proximal left common iliac artery is normal in caliber measuring 1.2  cm in maximal diameter.      Impression    IMPRESSION:  No evidence of an abdominal aortic aneurysm.    ------------------------------------------------------------  Aorta diameter measurement classification:  < 2.5cm: Normal.  2.5 - 2.9cm: Ectatic.  >3cm: Aneurysmal.    I have personally reviewed the examination and initial interpretation  and I agree with the findings.    DIOGO ASHLEY, DO     Recent Labs   Lab Test 12/09/19  0704 06/18/19  1527 12/18/18  1000 06/20/18  1111   PSA 0.40 0.42 0.30 0.28   TESTOSTTOTAL 202*  --  157* 223*          ASSESSMENT AND PLAN   1. Hormone sensitive prostate cancer - Cady 3+4     s/p definitive radiation therapy 7800 cGy in 39 fractions from 6/9-8/3/15    ADT with 3 doses from Oct 2014 through April 2015  2. ECOG PS 0  3. No medical comorbidities  4. Followed every 6 months for surveillance      Bill is doing well. He has no new complains. He continues to do well. He had labs done locally. He had labs drawn last month. He has looked at the results. I reviewed all of them with him. All of them are essentially normal except for mildly low testosterone which is not new and infact improved from last time. He is not symptomatic from this. His LDL is marginally higher at 120.     I will continue to see him in 6 months with  PSA and testosterone. I explained him that irrespective of PSA value (unless it is dramatically different), we will not be acting on it at this time. We will continue to follow this. His PSA values for last year have been stable.     I have refilled his tamsulosin.

## 2020-01-09 NOTE — LETTER
1/9/2020         RE: Jam Nice  [address] [facility] [address]        Dear Colleague,    Thank you for referring your patient, Jam Nice, to the [facility]. Please see a copy of my visit note below.    Oncology Rooming Note    January 9, 2020 8:24 AM   Jam Niec is a 74 year old male who presents for:    Chief Complaint   Patient presents with     Oncology Clinic Visit     Malignant neoplasm of prostate (H)     Initial Vitals: /74 (BP Location: Left arm, Patient Position: Sitting, Cuff Size: Adult Large)   Pulse 61   Temp 98.6  F (37  C) (Oral)   Resp 18   Ht 1.829 m (6')   Wt 95.8 kg (211 lb 3.2 oz)   SpO2 98%   BMI 28.64 kg/m    Estimated body mass index is 28.64 kg/m  as calculated from the following:    Height as of this encounter: 1.829 m (6').    Weight as of this encounter: 95.8 kg (211 lb 3.2 oz). Body surface area is 2.21 meters squared.  No Pain (0) Comment: Data Unavailable   No LMP for male patient.  Allergies reviewed: Yes  Medications reviewed: Yes    Medications: MEDICATION REFILLS NEEDED TODAY. Provider was notified. Refill FLOMAX  Pharmacy name entered into [facility]:    [facility] - [place], [place] - [address] [place] [address] [phone]  [facility] - [place], [place] - [address] [place] [address]    Clinical concerns: no        Leah Amezcua CMA                [facility]  HEMATOLOGY AND ONCOLOGY    FOLLOW-UP VISIT NOTE    PATIENT NAME: Jam Nice MRN # [id number]  DATE OF VISIT: Jan 9, 2020 YOB: 1945    REFERRING PROVIDER: Referred Self, MD  No address on file    CANCER TYPE: Prostate cancer  STAGE: M0 Stage II with biochemical PSA relapse post radiation therapy with concomitant hormones                                                    TREATMENT SUMMARY:  - Prostate biopsy in 2008 for rising PSA - 4.8; revealed [place] 3+3 disease  -  He received two injections of  three-month Zoladex Depot (10/3/08 and 1/5/09) in prep for concurrent radiation therapy but opted for surveillance  - Rpt Prostate biopsy for rising PSA done on 10/13/10 revealed again [place] 3+3 disease  - He had repeat prostate biopsy for rising PSA on 8/26/14 which now revealed [place] 3+4 disease  - He was treated with ADT - Zoladex Depot and recieved 3 doses (10.8 mg) on October 8, 2014, January 9, 2015, and April 2, 2015, respectively.  - He underwent definitive radiation therapy from June 9, 2015 --- August 3, 2015 and received 7800 cGy in 39 fractions.  - He is being followed with PSA every 6 months since then     CURRENT INTERVENTIONS:  Observation    SUBJECTIVE   Jam Nice is being followed for prostate cancer with biochemical relapse.     He has no new complains. He continues to do well. He had a quite [place].       PAST MEDICAL HISTORY     Past Medical History:   Diagnosis Date     Basal cell carcinoma of skin      Hearing problem Aug 2015     History of radiation therapy 2015    Prostate     Hyperlipidemia     statin intolerant     Prostate cancer (H) 2012    S/P radiation rx     Reduced vision      Tinnitus     Ongoing         CURRENT OUTPATIENT MEDICATIONS     Current Outpatient Medications   Medication Sig     PSYLLIUM HUSK PO      tamsulosin (FLOMAX) 0.4 MG capsule TAKE 1 CAPSULE BY MOUTH EVERY DAY     triamcinolone (KENALOG) 0.1 % external cream Apply topically 2 times daily     No current facility-administered medications for this visit.         ALLERGIES      Allergies   Allergen Reactions     Cats Cough and Itching     Simvastatin      Aches, low energy, not feeling well        REVIEW OF SYSTEMS   As above in the HPI, o/w complete 12-point ROS was negative.     PHYSICAL EXAM   /74 (BP Location: Left arm, Patient Position: Sitting, Cuff Size: Adult Large)   Pulse 61   Temp 98.6  F (37  C) (Oral)   Resp 18   Ht 1.829 m (6')   Wt 95.8 kg (211 lb 3.2 oz)   SpO2 98%   BMI  28.64 kg/m     GEN: NAD  HEENT: PERRL, EOMI, no icterus, injection or pallor. Oropharynx is clear.  LYMPHATICs: no cervical or supraclavicular lymphadenopathy; no other abn lymphadenopathy  PULMONARY: clear with good air entry bilaterally  CARDIOVASCULAR: regular, no murmurs, rubs, or gallops  GASTROINTESTINAL: soft, non-tender, non-distended, normal bowel sounds, no hepatosplenomegaly by percussion or palpation  MUSCULOSKELTAL: warm, well perfused, no edema  NEURO: awake, alert and oriented to time place and person, cranial nerves intact - II - XII, no focal neurologic deficits  SKIN: no rashes     LABORATORY AND IMAGING STUDIES     Recent Labs   Lab Test 12/09/19  0704 01/09/19  0724 12/18/18  1000 06/20/18  1111     --  140 142   POTASSIUM 4.5  --  4.0 4.8   CHLORIDE 110*  --  108 109   CO2 29  --  23 27   ANIONGAP 2*  --  9 6   BUN 23  --  24 22   CR 0.93  --  0.76 0.89   * 97 157* 89   LUZ 9.1  --  8.4* 8.6     No results for input(s): MAG, PHOS in the last 23737 hours.  Recent Labs   Lab Test 12/09/19  0704 12/18/18  1000 06/20/18  1111   WBC 4.9 5.9 5.6   HGB 14.2 13.9 14.6    192 183    96 97   NEUTROPHIL  --  71.9 73.1     Recent Labs   Lab Test 12/09/19  0704 12/18/18  1000 06/20/18  1111   BILITOTAL 0.4 0.5 0.5   ALKPHOS 70 70 67   ALT 22 22 16   AST 11 14 12   ALBUMIN 3.8 3.5 3.9     No results found for: TSH  No results for input(s): CEA in the last 01810 hours.  Results for orders placed or performed in visit on 12/09/19   US Abdominal Aorta Imaging    Narrative    EXAMINATION: US ABDOMINAL AORTA LIMITED, 12/9/2019 7:36 AM     COMPARISON: None.    HISTORY: Former smoker    TECHNIQUE: Gray-scale and color Doppler ultrasound of the abdominal  aorta.    FINDINGS: Examination of the abdominal aorta is performed.    Proximal abdominal aorta: 2.0 x 2.0 cm.    Mid abdominal aorta: 1.9 x 1.6 cm.    Distal abdominal aorta: 1.7 x 1.6 cm.    Proximal right common iliac artery is normal  in caliber measuring 1.2  cm in maximal diameter.    Proximal left common iliac artery is normal in caliber measuring 1.2  cm in maximal diameter.      Impression    IMPRESSION:  No evidence of an abdominal aortic aneurysm.    ------------------------------------------------------------  Aorta diameter measurement classification:  < 2.5cm: Normal.  2.5 - 2.9cm: Ectatic.  >3cm: Aneurysmal.    I have personally reviewed the examination and initial interpretation  and I agree with the findings.    DIOGO ASHLEY, DO     Recent Labs   Lab Test 12/09/19  0704 06/18/19  1527 12/18/18  1000 06/20/18  1111   PSA 0.40 0.42 0.30 0.28   TESTOSTTOTAL 202*  --  157* 223*          ASSESSMENT AND PLAN   1. Hormone sensitive prostate cancer - [place] 3+4     s/p definitive radiation therapy 7800 cGy in 39 fractions from 6/9-8/3/15    ADT with 3 doses from Oct 2014 through April 2015  2. ECOG PS 0  3. No medical comorbidities  4. Followed every 6 months for surveillance      Bill is doing well. He has no new complains. He continues to do well. He had labs done locally. He had labs drawn last month. He has looked at the results. I reviewed all of them with him. All of them are essentially normal except for mildly low testosterone which is not new and infact improved from last time. He is not symptomatic from this. His LDL is marginally higher at 120.     I will continue to see him in 6 months with PSA and testosterone. I explained him that irrespective of PSA value (unless it is dramatically different), we will not be acting on it at this time. We will continue to follow this. His PSA values for last year have been stable.     I have refilled his tamsulosin.       Again, thank you for allowing me to participate in the care of your patient.        Sincerely,        Franky Arroyo MD

## 2020-01-16 ENCOUNTER — TELEPHONE (OUTPATIENT)
Dept: ONCOLOGY | Facility: CLINIC | Age: 75
End: 2020-01-16

## 2020-01-16 NOTE — TELEPHONE ENCOUNTER
Patient returned call regarding scheduling future appointments. Patient declined scheduling at this time stating he will call  to due date of June 2020.  DO NOT call patient per his request

## 2020-01-17 ENCOUNTER — MYC MEDICAL ADVICE (OUTPATIENT)
Dept: ONCOLOGY | Facility: CLINIC | Age: 75
End: 2020-01-17

## 2020-01-17 DIAGNOSIS — R35.1 BENIGN PROSTATIC HYPERPLASIA WITH NOCTURIA: Primary | ICD-10-CM

## 2020-01-17 DIAGNOSIS — N40.1 BENIGN PROSTATIC HYPERPLASIA WITH NOCTURIA: Primary | ICD-10-CM

## 2020-01-17 DIAGNOSIS — C61 MALIGNANT NEOPLASM OF PROSTATE (H): ICD-10-CM

## 2020-01-17 RX ORDER — TAMSULOSIN HYDROCHLORIDE 0.4 MG/1
0.8 CAPSULE ORAL DAILY
Qty: 180 CAPSULE | Refills: 3 | Status: SHIPPED | OUTPATIENT
Start: 2020-01-17 | End: 2020-02-26

## 2020-02-26 ENCOUNTER — MYC REFILL (OUTPATIENT)
Dept: ONCOLOGY | Facility: CLINIC | Age: 75
End: 2020-02-26

## 2020-02-26 DIAGNOSIS — R35.1 BENIGN PROSTATIC HYPERPLASIA WITH NOCTURIA: ICD-10-CM

## 2020-02-26 DIAGNOSIS — C61 MALIGNANT NEOPLASM OF PROSTATE (H): ICD-10-CM

## 2020-02-26 DIAGNOSIS — N40.1 BENIGN PROSTATIC HYPERPLASIA WITH NOCTURIA: ICD-10-CM

## 2020-02-26 RX ORDER — TAMSULOSIN HYDROCHLORIDE 0.4 MG/1
0.8 CAPSULE ORAL DAILY
Qty: 180 CAPSULE | Refills: 3 | Status: CANCELLED | OUTPATIENT
Start: 2020-02-26

## 2020-02-26 RX ORDER — TAMSULOSIN HYDROCHLORIDE 0.4 MG/1
0.8 CAPSULE ORAL DAILY
Qty: 180 CAPSULE | Refills: 3 | Status: SHIPPED | OUTPATIENT
Start: 2020-02-26 | End: 2021-01-05

## 2020-02-27 NOTE — TELEPHONE ENCOUNTER
Duplicate request see other [employer] message  Tamara South RN on 2/27/2020 at 1:17 PM

## 2020-03-17 ENCOUNTER — MYC MEDICAL ADVICE (OUTPATIENT)
Dept: INTERNAL MEDICINE | Facility: CLINIC | Age: 75
End: 2020-03-17

## 2020-04-07 ENCOUNTER — TELEPHONE (OUTPATIENT)
Dept: DERMATOLOGY | Facility: CLINIC | Age: 75
End: 2020-04-07

## 2020-04-07 NOTE — TELEPHONE ENCOUNTER
Left voicemail asking to call clinic back in regards to upcoming appointment. [employer] message sent.    Nimo Diaz LPN

## 2020-06-30 ENCOUNTER — MYC MEDICAL ADVICE (OUTPATIENT)
Dept: INTERNAL MEDICINE | Facility: CLINIC | Age: 75
End: 2020-06-30

## 2020-06-30 DIAGNOSIS — M67.40 GANGLION CYST: Primary | ICD-10-CM

## 2020-07-07 ASSESSMENT — ENCOUNTER SYMPTOMS
SKIN CHANGES: 0
POOR WOUND HEALING: 0
NAIL CHANGES: 0
DIFFICULTY URINATING: 1
DYSURIA: 0
HEMATURIA: 0
SINUS PAIN: 0
SINUS CONGESTION: 0
TASTE DISTURBANCE: 0
SMELL DISTURBANCE: 0
HOARSE VOICE: 0
FLANK PAIN: 0
NECK MASS: 0
SORE THROAT: 0
TROUBLE SWALLOWING: 0

## 2020-07-08 DIAGNOSIS — C61 MALIGNANT NEOPLASM OF PROSTATE (H): ICD-10-CM

## 2020-07-08 LAB
ALBUMIN SERPL-MCNC: 3.8 G/DL (ref 3.4–5)
ALP SERPL-CCNC: 65 U/L (ref 40–150)
ALT SERPL W P-5'-P-CCNC: 20 U/L (ref 0–70)
ANION GAP SERPL CALCULATED.3IONS-SCNC: 2 MMOL/L (ref 3–14)
AST SERPL W P-5'-P-CCNC: 13 U/L (ref 0–45)
BASOPHILS # BLD AUTO: 0 10E9/L (ref 0–0.2)
BASOPHILS NFR BLD AUTO: 0.5 %
BILIRUB SERPL-MCNC: 0.5 MG/DL (ref 0.2–1.3)
BUN SERPL-MCNC: 24 MG/DL (ref 7–30)
CALCIUM SERPL-MCNC: 8.8 MG/DL (ref 8.5–10.1)
CHLORIDE SERPL-SCNC: 109 MMOL/L (ref 94–109)
CO2 SERPL-SCNC: 29 MMOL/L (ref 20–32)
CREAT SERPL-MCNC: 0.85 MG/DL (ref 0.66–1.25)
DIFFERENTIAL METHOD BLD: NORMAL
EOSINOPHIL # BLD AUTO: 0.1 10E9/L (ref 0–0.7)
EOSINOPHIL NFR BLD AUTO: 1.9 %
ERYTHROCYTE [DISTWIDTH] IN BLOOD BY AUTOMATED COUNT: 13 % (ref 10–15)
GFR SERPL CREATININE-BSD FRML MDRD: 86 ML/MIN/{1.73_M2}
GLUCOSE SERPL-MCNC: 81 MG/DL (ref 70–99)
HCT VFR BLD AUTO: 45.4 % (ref 40–53)
HGB BLD-MCNC: 14.4 G/DL (ref 13.3–17.7)
IMM GRANULOCYTES # BLD: 0 10E9/L (ref 0–0.4)
IMM GRANULOCYTES NFR BLD: 0.6 %
LYMPHOCYTES # BLD AUTO: 1.3 10E9/L (ref 0.8–5.3)
LYMPHOCYTES NFR BLD AUTO: 20.4 %
MCH RBC QN AUTO: 31.9 PG (ref 26.5–33)
MCHC RBC AUTO-ENTMCNC: 31.7 G/DL (ref 31.5–36.5)
MCV RBC AUTO: 100 FL (ref 78–100)
MONOCYTES # BLD AUTO: 0.6 10E9/L (ref 0–1.3)
MONOCYTES NFR BLD AUTO: 9.4 %
NEUTROPHILS # BLD AUTO: 4.1 10E9/L (ref 1.6–8.3)
NEUTROPHILS NFR BLD AUTO: 67.2 %
NRBC # BLD AUTO: 0 10*3/UL
NRBC BLD AUTO-RTO: 0 /100
PLATELET # BLD AUTO: 219 10E9/L (ref 150–450)
POTASSIUM SERPL-SCNC: 5.2 MMOL/L (ref 3.4–5.3)
PROT SERPL-MCNC: 7.3 G/DL (ref 6.8–8.8)
PSA SERPL-MCNC: 0.38 UG/L (ref 0–4)
RBC # BLD AUTO: 4.52 10E12/L (ref 4.4–5.9)
SODIUM SERPL-SCNC: 140 MMOL/L (ref 133–144)
WBC # BLD AUTO: 6.2 10E9/L (ref 4–11)

## 2020-07-08 PROCEDURE — 84153 ASSAY OF PSA TOTAL: CPT | Performed by: INTERNAL MEDICINE

## 2020-07-08 PROCEDURE — 84403 ASSAY OF TOTAL TESTOSTERONE: CPT | Performed by: INTERNAL MEDICINE

## 2020-07-08 PROCEDURE — 80053 COMPREHEN METABOLIC PANEL: CPT | Performed by: INTERNAL MEDICINE

## 2020-07-08 PROCEDURE — 85025 COMPLETE CBC W/AUTO DIFF WBC: CPT | Performed by: INTERNAL MEDICINE

## 2020-07-08 NOTE — NURSING NOTE
Chief Complaint   Patient presents with     Blood Draw     lab only appointment.  labs drawn by venipuncture by rn in lab.      Fannie Hidalgo RN

## 2020-07-09 DIAGNOSIS — M67.40 GANGLION CYST: Primary | ICD-10-CM

## 2020-07-09 LAB — TESTOST SERPL-MCNC: 172 NG/DL (ref 240–950)

## 2020-07-10 ENCOUNTER — OFFICE VISIT (OUTPATIENT)
Dept: ORTHOPEDICS | Facility: CLINIC | Age: 75
End: 2020-07-10
Attending: INTERNAL MEDICINE
Payer: MEDICARE

## 2020-07-10 ENCOUNTER — ANCILLARY PROCEDURE (OUTPATIENT)
Dept: GENERAL RADIOLOGY | Facility: CLINIC | Age: 75
End: 2020-07-10
Attending: ORTHOPAEDIC SURGERY
Payer: MEDICARE

## 2020-07-10 DIAGNOSIS — M67.40 GANGLION CYST: ICD-10-CM

## 2020-07-10 NOTE — LETTER
7/10/2020         RE: Jam Nice  [address]  [place] [address]        Dear Colleague,    Thank you for referring your patient, Jam Nice, to the [facility]. Please see a copy of my visit note below.    Orthopaedic Surgery Consultation  7/10/2020 10:40 AM   by Ish Salinas MD    Jam Nice MRN# [id number]   Age: 75 year old YOB: 1945     Reason for consult:  Mass right middle finger, mild right thumb pain                       Assessment and Plan:   Assessment:   Mass irregular finger, probable inclusion cyst  Right thumb MP joint osteoarthritis, primary      Plan:   Alvin and I reviewed the x-rays.  Likely the mass is an inclusion cyst.  It could be a ganglion.  Options would include observation versus aspiration versus surgical excision.  At this point he favors observation.  If it becomes more symptomatic we could consider other aspiration or surgical excision.  He can continue with activities as tolerated in the meantime.  In regards to the thumb the joint is minimally symptomatic.  No indications for any surgical treatment nor cortisone injections.  We could consider splinting if he becomes more symptomatic.  Overall I think that is unlikely and he certainly can continue using the hand as tolerated.  He has our contact information, will follow-up as needed.              History of Present Illness:   75 year old male with a right middle finger mass.  He is right-handed.  He works at the [employer].  The mass has been there for a number of years.  He does not recall any trauma.  He also gets some pain in the right thumb MP joint intermittently, very mild.  Does not affect his function.  He has had no treatment for either of these previously.           Past Medical History:     Patient Active Problem List   Diagnosis     Malignant neoplasm of prostate (H)     ACP (advance care planning)     Hyperlipidemia     Past Medical History:    Diagnosis Date     Basal cell carcinoma of skin      Hearing problem Aug 2015     History of radiation therapy 2015    Prostate     Hyperlipidemia     statin intolerant     Prostate cancer (H) 2012    S/P radiation rx     Reduced vision      Tinnitus     Ongoing            Past Surgical History:   Relevant surgical history as mentioned in HPI.  Past Surgical History:   Procedure Laterality Date     .menisectomy Left 1967     HERNIA REPAIR       ORTHOPEDIC SURGERY              Social History:     Social History     Socioeconomic History     Marital status:      Spouse name: Not on file     Number of children: Not on file     Years of education: Not on file     Highest education level: Not on file   Occupational History     Occupation: retired     Employer: [employer]   Social Needs     Financial resource strain: Not on file     Food insecurity     Worry: Not on file     Inability: Not on file     Transportation needs     Medical: Not on file     Non-medical: Not on file   Tobacco Use     Smoking status: Former Smoker     Packs/day: 1.50     Years: 10.00     Pack years: 15.00     Types: Cigarettes     Start date: 1964     Last attempt to quit: 1985     Years since quittin.5     Smokeless tobacco: Never Used   Substance and Sexual Activity     Alcohol use: Not Currently     Drug use: No     Sexual activity: Never   Lifestyle     Physical activity     Days per week: Not on file     Minutes per session: Not on file     Stress: Not on file   Relationships     Social connections     Talks on phone: Not on file     Gets together: Not on file     Attends Confucianism service: Not on file     Active member of club or organization: Not on file     Attends meetings of clubs or organizations: Not on file     Relationship status: Not on file     Intimate partner violence     Fear of current or ex partner: Not on file     Emotionally abused: Not on file     Physically abused: Not on file     Forced sexual  activity: Not on file   Other Topics Concern     Not on file   Social History Narrative     Not on file     Smoking, EtOH,        Family History:     Family History   Problem Relation Age of Onset     Alcoholism Mother          90     Cerebrovascular Disease Mother         Possible     Substance Abuse Mother      Migraines Mother      Stomach Problem Mother      Coronary Artery Disease Father          51 MI     Heart Disease Father          at age 59     Substance Abuse Father      Alcoholism Sister          55     Cancer Sister      Substance Abuse Sister      Substance Abuse Sister      Dementia Sister         Recently diagnosed     No history of problems with bleeding or anesthesia       Allergies:     Allergies   Allergen Reactions     Cats Cough and Itching     Simvastatin      Aches, low energy, not feeling well          Medications:     Current Outpatient Medications   Medication Sig     PSYLLIUM HUSK PO      tamsulosin (FLOMAX) 0.4 MG capsule Take 2 capsules (0.8 mg) by mouth daily     triamcinolone (KENALOG) 0.1 % external cream Apply topically 2 times daily     No current facility-administered medications for this visit.              Review of Systems:   A comprehensive 10 point review of systems (constitutional, ENT, cardiac, peripheral vascular, lymphatic, respiratory, GI, , Musculoskeletal, skin, Neurological) was performed and found to be negative except as described in this note.           Physical Exam:     EXAMINATION pertinent findings:   VITAL SIGNS: There were no vitals taken for this visit.  There is no height or weight on file to calculate BMI.  RESP: non labored breathing   CARD: comfortable, no acute distress  ABD: benign   Examination of the right hand demonstrates a small mass on the ulnar aspect of the middle finger proximal phalanx.  The mass is approximately 1 cm x 1 cm, nontender, mobile.  There is a small well-healed chronic laceration over the surface of the mass.   The mass is whitish in coloration.  Overall consistent with an inclusion cyst, could be a small ganglion cyst as well.  He has full range of motion of the digit, no neurovascular deficits.  The right thumb has some very mild tenderness over the MP joint and very mild instability with radial and ulnar stressing.  No motor, no sensory deficits are noted.  No significant atrophy.  There is brisk capillary refill in all digits and a palpable radial pulse.  No overlying skin changes noted.            Data:       X-rays of the right hand are reviewed today, AP, lateral, oblique.  No acute changes.  The thumb MP joint is arthritic, with more involvement on the radial side of the MP joint and on the ulnar side.  Likely responsible for the instability.  The middle finger appears to be within normal limits as far as the proximal, middle, distal phalanges are concerned  Possibly some mass-effect on the ulnar side of the proximal phalanx underlying the mass.  No other bony involvement though.    Total Time = 20 min, 50% of which was spent in counseling and coordination of care as documented above.    Ish Salinas MD  Orthopedic Surgery, Upper Extremity  Cell [phone]       Answers for HPI/ROS submitted by the patient on 7/7/2020   General Symptoms: No  Skin Symptoms: Yes  HENT Symptoms: Yes  EYE SYMPTOMS: No  HEART SYMPTOMS: No  LUNG SYMPTOMS: No  INTESTINAL SYMPTOMS: No  URINARY SYMPTOMS: Yes  REPRODUCTIVE SYMPTOMS: No  SKELETAL SYMPTOMS: No  BLOOD SYMPTOMS: No  NERVOUS SYSTEM SYMPTOMS: No  MENTAL HEALTH SYMPTOMS: No  Changes in hair: No  Changes in moles/birth marks: No  Itching: Yes  Rashes: Yes  Changes in nails: No  Acne: No  Change in facial hair: No  Warts: No  Non-healing sores: No  Scarring: No  Flaking of skin: No  Color changes of hands/feet in cold : No  Sun sensitivity: No  Skin thickening: No  Ear pain: No  Ear discharge: No  Hearing loss: No  Tinnitus: Yes  Nosebleeds: No  Congestion: No  Sinus pain:  No  Trouble swallowing: No   Voice hoarseness: No  Mouth sores: No  Sore throat: No  Tooth pain: No  Gum tenderness: No  Bleeding gums: No  Change in taste: No  Change in sense of smell: No  Dry mouth: No  Hearing aid used: No  Neck lump: No  Trouble holding urine or incontinence: No  Pain or burning: No  Trouble starting or stopping: Yes  Increased frequency of urination: No  Blood in urine: No  Decreased frequency of urination: No  Frequent nighttime urination: Yes  Flank pain: No  Difficulty emptying bladder: Yes

## 2020-07-10 NOTE — PROGRESS NOTES
Orthopaedic Surgery Consultation  7/10/2020 10:40 AM   by Ish Salinas MD    Jam Nice MRN# [id number]   Age: 75 year old YOB: 1945     Reason for consult:  Mass right middle finger, mild right thumb pain                       Assessment and Plan:   Assessment:   Mass irregular finger, probable inclusion cyst  Right thumb MP joint osteoarthritis, primary      Plan:   Alvin and I reviewed the x-rays.  Likely the mass is an inclusion cyst.  It could be a ganglion.  Options would include observation versus aspiration versus surgical excision.  At this point he favors observation.  If it becomes more symptomatic we could consider other aspiration or surgical excision.  He can continue with activities as tolerated in the meantime.  In regards to the thumb the joint is minimally symptomatic.  No indications for any surgical treatment nor cortisone injections.  We could consider splinting if he becomes more symptomatic.  Overall I think that is unlikely and he certainly can continue using the hand as tolerated.  He has our contact information, will follow-up as needed.              History of Present Illness:   75 year old male with a right middle finger mass.  He is right-handed.  He works at the [employer].  The mass has been there for a number of years.  He does not recall any trauma.  He also gets some pain in the right thumb MP joint intermittently, very mild.  Does not affect his function.  He has had no treatment for either of these previously.           Past Medical History:     Patient Active Problem List   Diagnosis     Malignant neoplasm of prostate (H)     ACP (advance care planning)     Hyperlipidemia     Past Medical History:   Diagnosis Date     Basal cell carcinoma of skin      Hearing problem Aug 2015     History of radiation therapy 2015    Prostate     Hyperlipidemia     statin intolerant     Prostate cancer (H) 2012    S/P radiation rx     Reduced vision      Tinnitus      Ongoing            Past Surgical History:   Relevant surgical history as mentioned in HPI.  Past Surgical History:   Procedure Laterality Date     .menisectomy Left 1967     HERNIA REPAIR       ORTHOPEDIC SURGERY              Social History:     Social History     Socioeconomic History     Marital status:      Spouse name: Not on file     Number of children: Not on file     Years of education: Not on file     Highest education level: Not on file   Occupational History     Occupation: retired     Employer: [employer]   Social Needs     Financial resource strain: Not on file     Food insecurity     Worry: Not on file     Inability: Not on file     Transportation needs     Medical: Not on file     Non-medical: Not on file   Tobacco Use     Smoking status: Former Smoker     Packs/day: 1.50     Years: 10.00     Pack years: 15.00     Types: Cigarettes     Start date: 1964     Last attempt to quit: 1985     Years since quittin.5     Smokeless tobacco: Never Used   Substance and Sexual Activity     Alcohol use: Not Currently     Drug use: No     Sexual activity: Never   Lifestyle     Physical activity     Days per week: Not on file     Minutes per session: Not on file     Stress: Not on file   Relationships     Social connections     Talks on phone: Not on file     Gets together: Not on file     Attends Church service: Not on file     Active member of club or organization: Not on file     Attends meetings of clubs or organizations: Not on file     Relationship status: Not on file     Intimate partner violence     Fear of current or ex partner: Not on file     Emotionally abused: Not on file     Physically abused: Not on file     Forced sexual activity: Not on file   Other Topics Concern     Not on file   Social History Narrative     Not on file     Smoking, EtOH,        Family History:     Family History   Problem Relation Age of Onset     Alcoholism Mother          90     Cerebrovascular  Disease Mother         Possible     Substance Abuse Mother      Migraines Mother      Stomach Problem Mother      Coronary Artery Disease Father          51 MI     Heart Disease Father          at age 59     Substance Abuse Father      Alcoholism Sister          55     Cancer Sister      Substance Abuse Sister      Substance Abuse Sister      Dementia Sister         Recently diagnosed     No history of problems with bleeding or anesthesia       Allergies:     Allergies   Allergen Reactions     Cats Cough and Itching     Simvastatin      Aches, low energy, not feeling well          Medications:     Current Outpatient Medications   Medication Sig     PSYLLIUM HUSK PO      tamsulosin (FLOMAX) 0.4 MG capsule Take 2 capsules (0.8 mg) by mouth daily     triamcinolone (KENALOG) 0.1 % external cream Apply topically 2 times daily     No current facility-administered medications for this visit.              Review of Systems:   A comprehensive 10 point review of systems (constitutional, ENT, cardiac, peripheral vascular, lymphatic, respiratory, GI, , Musculoskeletal, skin, Neurological) was performed and found to be negative except as described in this note.           Physical Exam:     EXAMINATION pertinent findings:   VITAL SIGNS: There were no vitals taken for this visit.  There is no height or weight on file to calculate BMI.  RESP: non labored breathing   CARD: comfortable, no acute distress  ABD: benign   Examination of the right hand demonstrates a small mass on the ulnar aspect of the middle finger proximal phalanx.  The mass is approximately 1 cm x 1 cm, nontender, mobile.  There is a small well-healed chronic laceration over the surface of the mass.  The mass is whitish in coloration.  Overall consistent with an inclusion cyst, could be a small ganglion cyst as well.  He has full range of motion of the digit, no neurovascular deficits.  The right thumb has some very mild tenderness over the MP joint  and very mild instability with radial and ulnar stressing.  No motor, no sensory deficits are noted.  No significant atrophy.  There is brisk capillary refill in all digits and a palpable radial pulse.  No overlying skin changes noted.            Data:       X-rays of the right hand are reviewed today, AP, lateral, oblique.  No acute changes.  The thumb MP joint is arthritic, with more involvement on the radial side of the MP joint and on the ulnar side.  Likely responsible for the instability.  The middle finger appears to be within normal limits as far as the proximal, middle, distal phalanges are concerned  Possibly some mass-effect on the ulnar side of the proximal phalanx underlying the mass.  No other bony involvement though.      Total Time = 20 min, 50% of which was spent in counseling and coordination of care as documented above.    Ish Salinas MD  Orthopedic Surgery, Upper Extremity  Cell [phone]       Answers for HPI/ROS submitted by the patient on 7/7/2020   General Symptoms: No  Skin Symptoms: Yes  HENT Symptoms: Yes  EYE SYMPTOMS: No  HEART SYMPTOMS: No  LUNG SYMPTOMS: No  INTESTINAL SYMPTOMS: No  URINARY SYMPTOMS: Yes  REPRODUCTIVE SYMPTOMS: No  SKELETAL SYMPTOMS: No  BLOOD SYMPTOMS: No  NERVOUS SYSTEM SYMPTOMS: No  MENTAL HEALTH SYMPTOMS: No  Changes in hair: No  Changes in moles/birth marks: No  Itching: Yes  Rashes: Yes  Changes in nails: No  Acne: No  Change in facial hair: No  Warts: No  Non-healing sores: No  Scarring: No  Flaking of skin: No  Color changes of hands/feet in cold : No  Sun sensitivity: No  Skin thickening: No  Ear pain: No  Ear discharge: No  Hearing loss: No  Tinnitus: Yes  Nosebleeds: No  Congestion: No  Sinus pain: No  Trouble swallowing: No   Voice hoarseness: No  Mouth sores: No  Sore throat: No  Tooth pain: No  Gum tenderness: No  Bleeding gums: No  Change in taste: No  Change in sense of smell: No  Dry mouth: No  Hearing aid used: No  Neck lump: No  Trouble  holding urine or incontinence: No  Pain or burning: No  Trouble starting or stopping: Yes  Increased frequency of urination: No  Blood in urine: No  Decreased frequency of urination: No  Frequent nighttime urination: Yes  Flank pain: No  Difficulty emptying bladder: Yes

## 2020-07-10 NOTE — NURSING NOTE
Reason For Visit:   Chief Complaint   Patient presents with     Consult For     Left hand ganglion cyst       Primary MD: Joey Davis    ?  No    Age: 75 year old    Occupation: .  Currently working? Yes.  Work status?  Part-time.  Date of surgery: NA  Type of surgery: NA.  Smoker: No  Request smoking cessation information: No      There were no vitals taken for this visit.      Pain Assessment  Patient Currently in Pain: Denies               QuickDASH Assessment  No flowsheet data found.       Allergies   Allergen Reactions     Cats Cough and Itching     Simvastatin      Aches, low energy, not feeling well       Leonora Hopson, ATC

## 2020-08-12 ENCOUNTER — OFFICE VISIT (OUTPATIENT)
Dept: DERMATOLOGY | Facility: CLINIC | Age: 75
End: 2020-08-12
Payer: MEDICARE

## 2020-08-12 DIAGNOSIS — L57.0 ACTINIC KERATOSIS: Primary | ICD-10-CM

## 2020-08-12 DIAGNOSIS — L40.9 PSORIASIS: ICD-10-CM

## 2020-08-12 DIAGNOSIS — Z85.828 HISTORY OF SKIN CANCER: ICD-10-CM

## 2020-08-12 RX ORDER — TRIAMCINOLONE ACETONIDE 1 MG/G
CREAM TOPICAL 2 TIMES DAILY
Qty: 30 G | Refills: 3 | Status: SHIPPED | OUTPATIENT
Start: 2020-08-12

## 2020-08-12 ASSESSMENT — PAIN SCALES - GENERAL: PAINLEVEL: NO PAIN (0)

## 2020-08-12 NOTE — LETTER
8/12/2020       RE: Jam Nice  [address]  [place] [id number]     Dear Colleague,    Thank you for referring your patient, Jam Nice, to the [facility] DERMATOLOGY at [facility]. Please see a copy of my visit note below.    [facility] Dermatology Note      Dermatology Problem List:  1. Hx of NMSC.  - BCC, pigmented nodular, right posterior ear, s/p Mohs ~2018  2. Actinic keratoses. S/p cryo   3. Psoriasis. Stable, on triamcinolone prn.      Encounter Date: Aug 12, 2020    CC:   Chief Complaint   Patient presents with     Derm Problem     patient is here today for a follow up.      History of Present Illness:  Mr. Jam Nice is a 75 year old male with PMH psoriasis and prior BCC (s/p Mohs ~3 years ago) who presents as a return patient for FBSE and spot check of multiple lesions including the right ear, left lower back, and bilateral temples. He reports the site of scar from prior BCC is occasionally itchy on a monthly basis, otherwise does not bother patient. Another mole on his back was identified by his internist who recommended follow-up with dermatology. Patient reports lesion is not painful, itchy, bleeding, or otherwise changing. He also has 2 new spots on the face, changing 6 months ago, enlarging and becoming more red/gritty. No pain or tenderness, not itchy. Patient is also interested in FBSE today.     Past Medical History:   Patient Active Problem List   Diagnosis     Malignant neoplasm of prostate (H)     ACP (advance care planning)     Hyperlipidemia     Past Medical History:   Diagnosis Date     Basal cell carcinoma of skin      Hearing problem Aug 2015     History of radiation therapy 2015    Prostate     Hyperlipidemia     statin intolerant     Prostate cancer (H) 2012    S/P radiation rx     Reduced vision      Tinnitus     Ongoing     Past Surgical History:   Procedure Laterality Date     .menisectomy Left       HERNIA REPAIR       ORTHOPEDIC SURGERY       Social History:  Patient reports that he quit smoking about 35 years ago. His smoking use included cigarettes. He started smoking about 56 years ago. He has a 15.00 pack-year smoking history. He has never used smokeless tobacco. He reports previous alcohol use. He reports that he does not use drugs.    Family History:  Family History   Problem Relation Age of Onset     Alcoholism Mother          90     Cerebrovascular Disease Mother         Possible     Substance Abuse Mother      Migraines Mother      Stomach Problem Mother      Coronary Artery Disease Father          51 MI     Heart Disease Father          at age 59     Substance Abuse Father      Alcoholism Sister          55     Cancer Sister      Substance Abuse Sister      Substance Abuse Sister      Dementia Sister         Recently diagnosed       Medications:  Current Outpatient Medications   Medication Sig Dispense Refill     PSYLLIUM HUSK PO        tamsulosin (FLOMAX) 0.4 MG capsule Take 2 capsules (0.8 mg) by mouth daily 180 capsule 3     triamcinolone (KENALOG) 0.1 % external cream Apply topically 2 times daily 30 g 3      Allergies   Allergen Reactions     Cats Cough and Itching     Simvastatin      Aches, low energy, not feeling well     Review of Systems:  -Constitutional: Otherwise feeling well today, in usual state of health.  -HEENT: Patient denies nonhealing oral sores.  -Skin: As above in HPI. No additional skin concerns.    Physical exam:  Vitals: There were no vitals taken for this visit.  GEN: This is a well developed, well-nourished female in no acute distress, in a pleasant mood.    SKIN: Full skin, which includes the head/face, both arms, chest, back, abdomen,both legs, buttocks, digits and/or nails, was examined.  -There are erythematous macules with overyling adherent scale on the bilateral temples and right ear.   - Scar is firm and without erythema,  telangectasias, nodularity, or pigmentation behind the right ear.   -There are waxy stuck on tan to brown papules on the left lower back, posterior legs, shoulders.  -No erythematous plaques or silvery micaceous scale seen on exam today.  -No other lesions of concern on areas examined.     Impression/Plan:    1. Actinic keratoses. Bilateral temples and right ear.    - Cryotherapy (see procedure note below)     2. Seborrheic keratosis, left lower back. No evidence of irritation.   - Counseled on reassuring features and benign nature of diagnosis. No further management needed at this time.     3. Psoriasis. Well-controlled with prn triamcinolone though patient has not needed.  - Continue to monitor     4. Hx of NMSC. BCC identified and treated in ~2018. No clinical evidence of recurrent disease via inspection or palpation; all sites well-healed.   - Photoprotection discussed (SPF30+ sunscreen and proper use, UPF clothing, sun avoidance, tanning bed avoidance)  - Continued skin surveillance recommended yearly     5. Hypertrophic scar, right ear fold. Site of prior BCC. No clinical evidence of recurrent disease via inspection or palpation; site well-healed.  - May use triamcinolone cream on scar for occasional itchiness/irritation     Cryotherapy procedure note: After verbal consent and discussion of risks and benefits including, but not limited to, dyspigmentation/scar, blister, and pain, 9 lesions on the temples, forehead, and ear was(were) treated with 1-2 mm freeze border for 1-2 cycles with liquid nitrogen. Post cryotherapy instructions were provided.    F/u 1 year for FBSE     Patient was staffed by Dr. Montilla.     Martha Munoz MD   Dermatology Resident   [facility]       I was present for the entire procedure. Marina Montilla MD  I, Marina Montilla MD, saw this patient with the resident and agree with the resident s findings and plan of care as documented in the resident s note.        Again,  thank you for allowing me to participate in the care of your patient.      Sincerely,    Marina Montilla MD

## 2020-08-12 NOTE — NURSING NOTE
Chief Complaint   Patient presents with     Derm Problem     patient is here today for a follow up.      Felicia MOLINA CMA

## 2020-08-12 NOTE — PROGRESS NOTES
[facility] Dermatology Note      Dermatology Problem List:  1. Hx of NMSC.  - BCC, pigmented nodular, right posterior ear, s/p Mohs ~2018  2. Actinic keratoses. S/p cryo   3. Psoriasis. Stable, on triamcinolone prn.      Encounter Date: Aug 12, 2020    CC:   Chief Complaint   Patient presents with     Derm Problem     patient is here today for a follow up.      History of Present Illness:  Mr. Jam Nice is a 75 year old male with PMH psoriasis and prior BCC (s/p Mohs ~3 years ago) who presents as a return patient for FBSE and spot check of multiple lesions including the right ear, left lower back, and bilateral temples. He reports the site of scar from prior BCC is occasionally itchy on a monthly basis, otherwise does not bother patient. Another mole on his back was identified by his internist who recommended follow-up with dermatology. Patient reports lesion is not painful, itchy, bleeding, or otherwise changing. He also has 2 new spots on the face, changing 6 months ago, enlarging and becoming more red/gritty. No pain or tenderness, not itchy. Patient is also interested in FBSE today.     Past Medical History:   Patient Active Problem List   Diagnosis     Malignant neoplasm of prostate (H)     ACP (advance care planning)     Hyperlipidemia     Past Medical History:   Diagnosis Date     Basal cell carcinoma of skin      Hearing problem Aug 2015     History of radiation therapy 2015    Prostate     Hyperlipidemia     statin intolerant     Prostate cancer (H) 2012    S/P radiation rx     Reduced vision      Tinnitus     Ongoing     Past Surgical History:   Procedure Laterality Date     .menisectomy Left 1967     HERNIA REPAIR  1948     ORTHOPEDIC SURGERY  1967     Social History:  Patient reports that he quit smoking about 35 years ago. His smoking use included cigarettes. He started smoking about 56 years ago. He has a 15.00 pack-year smoking history. He has never used smokeless tobacco.  He reports previous alcohol use. He reports that he does not use drugs.    Family History:  Family History   Problem Relation Age of Onset     Alcoholism Mother          90     Cerebrovascular Disease Mother         Possible     Substance Abuse Mother      Migraines Mother      Stomach Problem Mother      Coronary Artery Disease Father          51 MI     Heart Disease Father          at age 59     Substance Abuse Father      Alcoholism Sister          55     Cancer Sister      Substance Abuse Sister      Substance Abuse Sister      Dementia Sister         Recently diagnosed       Medications:  Current Outpatient Medications   Medication Sig Dispense Refill     PSYLLIUM HUSK PO        tamsulosin (FLOMAX) 0.4 MG capsule Take 2 capsules (0.8 mg) by mouth daily 180 capsule 3     triamcinolone (KENALOG) 0.1 % external cream Apply topically 2 times daily 30 g 3      Allergies   Allergen Reactions     Cats Cough and Itching     Simvastatin      Aches, low energy, not feeling well     Review of Systems:  -Constitutional: Otherwise feeling well today, in usual state of health.  -HEENT: Patient denies nonhealing oral sores.  -Skin: As above in HPI. No additional skin concerns.    Physical exam:  Vitals: There were no vitals taken for this visit.  GEN: This is a well developed, well-nourished female in no acute distress, in a pleasant mood.    SKIN: Full skin, which includes the head/face, both arms, chest, back, abdomen,both legs, buttocks, digits and/or nails, was examined.  -There are erythematous macules with overyling adherent scale on the bilateral temples and right ear.   - Scar is firm and without erythema, telangectasias, nodularity, or pigmentation behind the right ear.   -There are waxy stuck on tan to brown papules on the left lower back, posterior legs, shoulders.  -No erythematous plaques or silvery micaceous scale seen on exam today.  -No other lesions of concern on areas examined.      Impression/Plan:    1. Actinic keratoses. Bilateral temples and right ear.    - Cryotherapy (see procedure note below)     2. Seborrheic keratosis, left lower back. No evidence of irritation.   - Counseled on reassuring features and benign nature of diagnosis. No further management needed at this time.     3. Psoriasis. Well-controlled with prn triamcinolone though patient has not needed.  - Continue to monitor     4. Hx of NMSC. BCC identified and treated in ~2018. No clinical evidence of recurrent disease via inspection or palpation; all sites well-healed.   - Photoprotection discussed (SPF30+ sunscreen and proper use, UPF clothing, sun avoidance, tanning bed avoidance)  - Continued skin surveillance recommended yearly     5. Hypertrophic scar, right ear fold. Site of prior BCC. No clinical evidence of recurrent disease via inspection or palpation; site well-healed.  - May use triamcinolone cream on scar for occasional itchiness/irritation     Cryotherapy procedure note: After verbal consent and discussion of risks and benefits including, but not limited to, dyspigmentation/scar, blister, and pain, 9 lesions on the temples, forehead, and ear was(were) treated with 1-2 mm freeze border for 1-2 cycles with liquid nitrogen. Post cryotherapy instructions were provided.    F/u 1 year for FBSE     Patient was staffed by Dr. Montilla.     Martha Munoz MD   Dermatology Resident   [facility]       I was present for the entire procedure. Marina Montilla MD  I, Marina Montilla MD, saw this patient with the resident and agree with the resident s findings and plan of care as documented in the resident s note.

## 2020-08-12 NOTE — PATIENT INSTRUCTIONS
Of note, if you feel itchiness behind your right ear at the site of your scar, you may use the steroid cream to alleviate the itch.     Cryotherapy    What is it?    Use of a very cold liquid, such as liquid nitrogen, to freeze and destroy abnormal skin cells that need to be removed    What should I expect?    Tenderness and redness    A small blister that might grow and fill with dark purple blood. There may be crusting.    More than one treatment may be needed if the lesions do not go away.    How do I care for the treated area?    Gently wash the area with your hands when bathing.    Use a thin layer of Vaseline to help with healing. You may use a Band-Aid.     The area should heal within 7-10 days and may leave behind a pink or lighter color.     Do not use an antibiotic or Neosporin ointment.     You may take acetaminophen (Tylenol) for pain.     Call your Doctor if you have:    Severe pain    Signs of infection (warmth, redness, cloudy yellow drainage, and or a bad smell)    Questions or concerns    Who should I call with questions?       [facility]: [phone]       [facility]: [phone]       For urgent needs outside of business hours call the [facility] at [phone]        and ask for the dermatology resident on call

## 2020-09-10 DIAGNOSIS — Z00.00 ENCOUNTER FOR MEDICARE ANNUAL WELLNESS EXAM: ICD-10-CM

## 2020-09-10 LAB
CHOLEST SERPL-MCNC: 208 MG/DL
HDLC SERPL-MCNC: 52 MG/DL
LDLC SERPL CALC-MCNC: 131 MG/DL
NONHDLC SERPL-MCNC: 156 MG/DL
TRIGL SERPL-MCNC: 123 MG/DL

## 2020-09-10 PROCEDURE — 36415 COLL VENOUS BLD VENIPUNCTURE: CPT | Performed by: INTERNAL MEDICINE

## 2020-09-10 PROCEDURE — 80061 LIPID PANEL: CPT | Performed by: INTERNAL MEDICINE

## 2020-09-11 DIAGNOSIS — E78.5 HYPERLIPIDEMIA, UNSPECIFIED HYPERLIPIDEMIA TYPE: Primary | ICD-10-CM

## 2020-09-11 RX ORDER — ATORVASTATIN CALCIUM 20 MG/1
10 TABLET, FILM COATED ORAL DAILY
Qty: 50 TABLET | Refills: 3 | Status: SHIPPED | OUTPATIENT
Start: 2020-09-11 | End: 2020-09-14

## 2020-09-12 ENCOUNTER — MYC MEDICAL ADVICE (OUTPATIENT)
Dept: INTERNAL MEDICINE | Facility: CLINIC | Age: 75
End: 2020-09-12

## 2020-09-12 DIAGNOSIS — E78.5 HYPERLIPIDEMIA, UNSPECIFIED HYPERLIPIDEMIA TYPE: ICD-10-CM

## 2020-09-14 RX ORDER — ATORVASTATIN CALCIUM 10 MG/1
10 TABLET, FILM COATED ORAL DAILY
Qty: 90 TABLET | Refills: 3 | Status: SHIPPED | OUTPATIENT
Start: 2020-09-14 | End: 2021-01-05

## 2020-12-06 ENCOUNTER — HEALTH MAINTENANCE LETTER (OUTPATIENT)
Age: 75
End: 2020-12-06

## 2020-12-28 DIAGNOSIS — C61 MALIGNANT NEOPLASM OF PROSTATE (H): ICD-10-CM

## 2020-12-28 DIAGNOSIS — E78.5 HYPERLIPIDEMIA, UNSPECIFIED HYPERLIPIDEMIA TYPE: ICD-10-CM

## 2020-12-28 LAB
ALBUMIN SERPL-MCNC: 3.9 G/DL (ref 3.4–5)
ALP SERPL-CCNC: 72 U/L (ref 40–150)
ALT SERPL W P-5'-P-CCNC: 24 U/L (ref 0–70)
ANION GAP SERPL CALCULATED.3IONS-SCNC: 3 MMOL/L (ref 3–14)
AST SERPL W P-5'-P-CCNC: 16 U/L (ref 0–45)
BASOPHILS # BLD AUTO: 0 10E9/L (ref 0–0.2)
BASOPHILS NFR BLD AUTO: 0.3 %
BILIRUB SERPL-MCNC: 0.6 MG/DL (ref 0.2–1.3)
BUN SERPL-MCNC: 22 MG/DL (ref 7–30)
CALCIUM SERPL-MCNC: 8.9 MG/DL (ref 8.5–10.1)
CHLORIDE SERPL-SCNC: 110 MMOL/L (ref 94–109)
CHOLEST SERPL-MCNC: 169 MG/DL
CO2 SERPL-SCNC: 27 MMOL/L (ref 20–32)
CREAT SERPL-MCNC: 0.83 MG/DL (ref 0.66–1.25)
DIFFERENTIAL METHOD BLD: ABNORMAL
EOSINOPHIL # BLD AUTO: 0.1 10E9/L (ref 0–0.7)
EOSINOPHIL NFR BLD AUTO: 1.2 %
ERYTHROCYTE [DISTWIDTH] IN BLOOD BY AUTOMATED COUNT: 13.3 % (ref 10–15)
GFR SERPL CREATININE-BSD FRML MDRD: 86 ML/MIN/{1.73_M2}
GLUCOSE SERPL-MCNC: 101 MG/DL (ref 70–99)
HCT VFR BLD AUTO: 43.6 % (ref 40–53)
HDLC SERPL-MCNC: 57 MG/DL
HGB BLD-MCNC: 13.7 G/DL (ref 13.3–17.7)
LDLC SERPL CALC-MCNC: 88 MG/DL
LYMPHOCYTES # BLD AUTO: 1.4 10E9/L (ref 0.8–5.3)
LYMPHOCYTES NFR BLD AUTO: 23.9 %
MCH RBC QN AUTO: 31.6 PG (ref 26.5–33)
MCHC RBC AUTO-ENTMCNC: 31.4 G/DL (ref 31.5–36.5)
MCV RBC AUTO: 101 FL (ref 78–100)
MONOCYTES # BLD AUTO: 0.5 10E9/L (ref 0–1.3)
MONOCYTES NFR BLD AUTO: 7.9 %
NEUTROPHILS # BLD AUTO: 3.9 10E9/L (ref 1.6–8.3)
NEUTROPHILS NFR BLD AUTO: 66.7 %
NONHDLC SERPL-MCNC: 112 MG/DL
PLATELET # BLD AUTO: 238 10E9/L (ref 150–450)
POTASSIUM SERPL-SCNC: 4.1 MMOL/L (ref 3.4–5.3)
PROT SERPL-MCNC: 7.3 G/DL (ref 6.8–8.8)
PSA SERPL-MCNC: 0.38 UG/L (ref 0–4)
RBC # BLD AUTO: 4.34 10E12/L (ref 4.4–5.9)
SODIUM SERPL-SCNC: 140 MMOL/L (ref 133–144)
TRIGL SERPL-MCNC: 121 MG/DL
WBC # BLD AUTO: 5.9 10E9/L (ref 4–11)

## 2020-12-28 PROCEDURE — 84153 ASSAY OF PSA TOTAL: CPT | Performed by: INTERNAL MEDICINE

## 2020-12-28 PROCEDURE — 80053 COMPREHEN METABOLIC PANEL: CPT | Performed by: INTERNAL MEDICINE

## 2020-12-28 PROCEDURE — 85025 COMPLETE CBC W/AUTO DIFF WBC: CPT | Performed by: INTERNAL MEDICINE

## 2020-12-28 PROCEDURE — 84403 ASSAY OF TOTAL TESTOSTERONE: CPT | Mod: 90 | Performed by: INTERNAL MEDICINE

## 2020-12-28 PROCEDURE — 99000 SPECIMEN HANDLING OFFICE-LAB: CPT | Performed by: INTERNAL MEDICINE

## 2020-12-28 PROCEDURE — 36415 COLL VENOUS BLD VENIPUNCTURE: CPT | Performed by: INTERNAL MEDICINE

## 2020-12-28 PROCEDURE — 80061 LIPID PANEL: CPT | Performed by: INTERNAL MEDICINE

## 2020-12-30 ENCOUNTER — MYC MEDICAL ADVICE (OUTPATIENT)
Dept: INTERNAL MEDICINE | Facility: CLINIC | Age: 75
End: 2020-12-30

## 2020-12-30 LAB — TESTOST SERPL-MCNC: 229 NG/DL (ref 240–950)

## 2021-01-05 ENCOUNTER — VIRTUAL VISIT (OUTPATIENT)
Dept: ONCOLOGY | Facility: CLINIC | Age: 76
End: 2021-01-05
Attending: INTERNAL MEDICINE
Payer: MEDICARE

## 2021-01-05 DIAGNOSIS — C61 MALIGNANT NEOPLASM OF PROSTATE (H): ICD-10-CM

## 2021-01-05 DIAGNOSIS — R35.1 BENIGN PROSTATIC HYPERPLASIA WITH NOCTURIA: ICD-10-CM

## 2021-01-05 DIAGNOSIS — N40.1 BENIGN PROSTATIC HYPERPLASIA WITH NOCTURIA: ICD-10-CM

## 2021-01-05 PROCEDURE — 99213 OFFICE O/P EST LOW 20 MIN: CPT | Mod: 95 | Performed by: INTERNAL MEDICINE

## 2021-01-05 PROCEDURE — 999N001193 HC VIDEO/TELEPHONE VISIT; NO CHARGE

## 2021-01-05 RX ORDER — TAMSULOSIN HYDROCHLORIDE 0.4 MG/1
0.8 CAPSULE ORAL DAILY
Qty: 180 CAPSULE | Refills: 3 | Status: SHIPPED | OUTPATIENT
Start: 2021-01-05 | End: 2021-01-06

## 2021-01-05 NOTE — LETTER
1/5/2021         RE: Jam Nice  [address] [facility] [id number]        Dear Colleague,    Thank you for referring your patient, Jam Nice, to the [facility]. Please see a copy of my visit note below.    Jam Nice is a 75 year old male who is being evaluated via a billable video visit.      How would you like to obtain your AVS? MyChart  If the video visit is dropped, the invitation should be resent by: Send to e-mail at: [email or web address]  Will anyone else be joining your video visit? No      Vitals - Patient Reported  Weight (Patient Reported): 84.4 kg (186 lb)  Height (Patient Reported): 182.9 cm (6')  BMI (Based on Pt Reported Ht/Wt): 25.23  Pain Score: No Pain (0)    Hayley Helm CMA January 5, 2021  4:37 PM       [facility]  HEMATOLOGY AND ONCOLOGY    FOLLOW-UP VISIT NOTE    PATIENT NAME: Jam Nice MRN # [id number]  DATE OF VISIT: Jan 5, 2021 YOB: 1945    REFERRING PROVIDER: Referred Self, MD  No address on file    CANCER TYPE: Prostate cancer  STAGE: M0 Stage II with biochemical PSA relapse post radiation therapy with concomitant hormones                                                    TREATMENT SUMMARY:  - Prostate biopsy in 2008 for rising PSA - 4.8; revealed [place] 3+3 disease  -  He received two injections of three-month Zoladex Depot (10/3/08 and 1/5/09) in prep for concurrent radiation therapy but opted for surveillance  - Rpt Prostate biopsy for rising PSA done on 10/13/10 revealed again [place] 3+3 disease  - He had repeat prostate biopsy for rising PSA on 8/26/14 which now revealed [place] 3+4 disease  - He was treated with ADT - Zoladex Depot and recieved 3 doses (10.8 mg) on October 8, 2014, January 9, 2015, and April 2, 2015, respectively.  - He underwent definitive radiation therapy from June 9, 2015 --- August 3, 2015 and received 7800 cGy in 39 fractions.  - He is being  followed with PSA every 6 months since then     CURRENT INTERVENTIONS:  Observation    SUBJECTIVE   Jam Nice is being followed for prostate cancer with biochemical relapse.     He has no new complains. He continues to do well. He had a quite [place].   He had successfully lost about 25 pounds in weight.  He notes that his weight.  He is very happy about his weight loss as he is feeling much better overall.      PAST MEDICAL HISTORY     Past Medical History:   Diagnosis Date     Basal cell carcinoma of skin      Hearing problem Aug 2015     History of radiation therapy 2015    Prostate     Hyperlipidemia     statin intolerant     Prostate cancer (H) 2012    S/P radiation rx     Reduced vision      Tinnitus     Ongoing         CURRENT OUTPATIENT MEDICATIONS     Current Outpatient Medications   Medication Sig     PSYLLIUM HUSK PO      tamsulosin (FLOMAX) 0.4 MG capsule Take 2 capsules (0.8 mg) by mouth daily     triamcinolone (KENALOG) 0.1 % external cream Apply topically 2 times daily     No current facility-administered medications for this visit.         ALLERGIES      Allergies   Allergen Reactions     Cats Cough and Itching     Simvastatin      Aches, low energy, not feeling well        REVIEW OF SYSTEMS   As above in the HPI, o/w complete 12-point ROS was negative.     PHYSICAL EXAM   There were no vitals taken for this visit.  GEN: NAD  HEENT: PERRL, EOMI, no icterus, injection or pallor. Oropharynx is clear.  LYMPHATICs: no cervical or supraclavicular lymphadenopathy; no other abn lymphadenopathy  PULMONARY: clear with good air entry bilaterally  CARDIOVASCULAR: regular, no murmurs, rubs, or gallops  GASTROINTESTINAL: soft, non-tender, non-distended, normal bowel sounds, no hepatosplenomegaly by percussion or palpation  MUSCULOSKELTAL: warm, well perfused, no edema  NEURO: awake, alert and oriented to time place and person, cranial nerves intact - II - XII, no focal neurologic deficits  SKIN: no  john     LABORATORY AND IMAGING STUDIES     Recent Labs   Lab Test 12/28/20  1049 07/08/20  0855 12/09/19  0704 01/09/19  0724 12/18/18  1000 06/20/18  1111    140 141  --  140 142   POTASSIUM 4.1 5.2 4.5  --  4.0 4.8   CHLORIDE 110* 109 110*  --  108 109   CO2 27 29 29  --  23 27   ANIONGAP 3 2* 2*  --  9 6   BUN 22 24 23  --  24 22   CR 0.83 0.85 0.93  --  0.76 0.89   * 81 101* 97 157* 89   LUZ 8.9 8.8 9.1  --  8.4* 8.6     No results for input(s): MAG, PHOS in the last 09625 hours.  Recent Labs   Lab Test 12/28/20  1049 07/08/20  0855 12/09/19  0704 12/18/18  1000 06/20/18  1111   WBC 5.9 6.2 4.9 5.9 5.6   HGB 13.7 14.4 14.2 13.9 14.6    219 209 192 183   * 100 100 96 97   NEUTROPHIL 66.7 67.2  --  71.9 73.1     Recent Labs   Lab Test 12/28/20  1049 07/08/20  0855 12/09/19  0704   BILITOTAL 0.6 0.5 0.4   ALKPHOS 72 65 70   ALT 24 20 22   AST 16 13 11   ALBUMIN 3.9 3.8 3.8     No results found for: TSH  No results for input(s): CEA in the last 96514 hours.  Results for orders placed or performed in visit on 07/10/20   XR Hand Left G/E 3 Views    Narrative    EXAM: XR HAND LT G/E 3 VW  7/10/2020 8:27 AM      HISTORY: Ganglion cyst    COMPARISON: None    FINDINGS: 3 views of left hand.    No acute osseous abnormality. No erosion. Mild triscaphe and first  carpometacarpal joint degenerative change. Degenerative change of the  first and second metacarpophalangeal joints. Cystic changes in the  proximal lunate. Distal radioulnar joint degenerative change.    Rounded soft tissue prominence ulnar to the distal shaft of the third  proximal phalanx measuring up to 11 mm.       Impression    IMPRESSION:   1. Rounded soft tissue prominence ulnar to the distal shaft of the  third proximal phalanx consistent with provided history of ganglion  cyst.    2. Polyarticular degenerative change.         I have personally reviewed the examination and initial interpretation  and I agree with the  findings.    MABEL (Rut BARNES MD     Recent Labs   Lab Test 12/28/20  1049 07/08/20  0855 12/09/19  0704 06/18/19  1527 12/18/18  1000 06/20/18  1111   PSA 0.38 0.38 0.40 0.42 0.30 0.28   TESTOSTTOTAL 229* 172* 202*  --  157* 223*      ASSESSMENT AND PLAN   1. Hormone sensitive prostate cancer - [place] 3+4     s/p definitive radiation therapy 7800 cGy in 39 fractions from 6/9-8/3/15    ADT with 3 doses from Oct 2014 through April 2015  2. ECOG PS 0  3. No medical comorbidities  4. Followed every 6 months for surveillance      Bill is doing well. He has no new complains. He continues to do well. He had labs done locally. He had labs drawn last month. He has looked at the results. I reviewed all of them with him. All of them are essentially normal except for mildly low testosterone which is not new and infact improved from last time. He is not symptomatic from this. His LDL was marginally higher at 120 at the last visit and has now dropped to 88-well within the normal range.  He has successfully lost about 25 pounds.  His lipid panel normalized after the weight loss.    I will continue to see him in 6 months with PSA and testosterone. I explained him that irrespective of PSA value (unless it is dramatically different), we will not be acting on it at this time. We will continue to follow this. His PSA values for last year have been stable.     I have refilled his tamsulosin.     Video-Visit Details    Type of service:  Video Visit  Originating Location (pt. Location): Home  Distant Location (provider location): [facility] CANCER [place]   Platform used for Video Visit: YaBattle    20 minutes spent on the date of the encounter doing chart review, history and exam, documentation and further activities as noted above      Franky Arroyo    Hematologist and Medical Oncologist  [facility]

## 2021-01-05 NOTE — PROGRESS NOTES
Jam Nice is a 75 year old male who is being evaluated via a billable video visit.      How would you like to obtain your AVS? MyChart  If the video visit is dropped, the invitation should be resent by: Send to e-mail at: [email or web address]  Will anyone else be joining your video visit? No      Vitals - Patient Reported  Weight (Patient Reported): 84.4 kg (186 lb)  Height (Patient Reported): 182.9 cm (6')  BMI (Based on Pt Reported Ht/Wt): 25.23  Pain Score: No Pain (0)    Hayley Helm, SUKH January 5, 2021  4:37 PM

## 2021-01-05 NOTE — PROGRESS NOTES
[facility]  HEMATOLOGY AND ONCOLOGY    FOLLOW-UP VISIT NOTE    PATIENT NAME: Jam Nice MRN # [id number]  DATE OF VISIT: Jan 5, 2021 YOB: 1945    REFERRING PROVIDER: Referred Self, MD  No address on file    CANCER TYPE: Prostate cancer  STAGE: M0 Stage II with biochemical PSA relapse post radiation therapy with concomitant hormones                                                    TREATMENT SUMMARY:  - Prostate biopsy in 2008 for rising PSA - 4.8; revealed [place] 3+3 disease  -  He received two injections of three-month Zoladex Depot (10/3/08 and 1/5/09) in prep for concurrent radiation therapy but opted for surveillance  - Rpt Prostate biopsy for rising PSA done on 10/13/10 revealed again [place] 3+3 disease  - He had repeat prostate biopsy for rising PSA on 8/26/14 which now revealed [place] 3+4 disease  - He was treated with ADT - Zoladex Depot and recieved 3 doses (10.8 mg) on October 8, 2014, January 9, 2015, and April 2, 2015, respectively.  - He underwent definitive radiation therapy from June 9, 2015 --- August 3, 2015 and received 7800 cGy in 39 fractions.  - He is being followed with PSA every 6 months since then     CURRENT INTERVENTIONS:  Observation    SUBJECTIVE   Jam Nice is being followed for prostate cancer with biochemical relapse.     He has no new complains. He continues to do well. He had a quite [place].   He had successfully lost about 25 pounds in weight.  He notes that his weight.  He is very happy about his weight loss as he is feeling much better overall.      PAST MEDICAL HISTORY     Past Medical History:   Diagnosis Date     Basal cell carcinoma of skin      Hearing problem Aug 2015     History of radiation therapy 2015    Prostate     Hyperlipidemia     statin intolerant     Prostate cancer (H) 2012    S/P radiation rx     Reduced vision      Tinnitus     Ongoing         CURRENT OUTPATIENT MEDICATIONS     Current Outpatient Medications    Medication Sig     PSYLLIUM HUSK PO      tamsulosin (FLOMAX) 0.4 MG capsule Take 2 capsules (0.8 mg) by mouth daily     triamcinolone (KENALOG) 0.1 % external cream Apply topically 2 times daily     No current facility-administered medications for this visit.         ALLERGIES      Allergies   Allergen Reactions     Cats Cough and Itching     Simvastatin      Aches, low energy, not feeling well        REVIEW OF SYSTEMS   As above in the HPI, o/w complete 12-point ROS was negative.     PHYSICAL EXAM   There were no vitals taken for this visit.  GEN: NAD  HEENT: PERRL, EOMI, no icterus, injection or pallor. Oropharynx is clear.  LYMPHATICs: no cervical or supraclavicular lymphadenopathy; no other abn lymphadenopathy  PULMONARY: clear with good air entry bilaterally  CARDIOVASCULAR: regular, no murmurs, rubs, or gallops  GASTROINTESTINAL: soft, non-tender, non-distended, normal bowel sounds, no hepatosplenomegaly by percussion or palpation  MUSCULOSKELTAL: warm, well perfused, no edema  NEURO: awake, alert and oriented to time place and person, cranial nerves intact - II - XII, no focal neurologic deficits  SKIN: no rashes     LABORATORY AND IMAGING STUDIES     Recent Labs   Lab Test 12/28/20  1049 07/08/20  0855 12/09/19  0704 01/09/19  0724 12/18/18  1000 06/20/18  1111    140 141  --  140 142   POTASSIUM 4.1 5.2 4.5  --  4.0 4.8   CHLORIDE 110* 109 110*  --  108 109   CO2 27 29 29  --  23 27   ANIONGAP 3 2* 2*  --  9 6   BUN 22 24 23  --  24 22   CR 0.83 0.85 0.93  --  0.76 0.89   * 81 101* 97 157* 89   LUZ 8.9 8.8 9.1  --  8.4* 8.6     No results for input(s): MAG, PHOS in the last 85026 hours.  Recent Labs   Lab Test 12/28/20  1049 07/08/20  0855 12/09/19  0704 12/18/18  1000 06/20/18  1111   WBC 5.9 6.2 4.9 5.9 5.6   HGB 13.7 14.4 14.2 13.9 14.6    219 209 192 183   * 100 100 96 97   NEUTROPHIL 66.7 67.2  --  71.9 73.1     Recent Labs   Lab Test 12/28/20  1049 07/08/20  0898  12/09/19  0704   BILITOTAL 0.6 0.5 0.4   ALKPHOS 72 65 70   ALT 24 20 22   AST 16 13 11   ALBUMIN 3.9 3.8 3.8     No results found for: TSH  No results for input(s): CEA in the last 59965 hours.  Results for orders placed or performed in visit on 07/10/20   XR Hand Left G/E 3 Views    Narrative    EXAM: XR HAND LT G/E 3 VW  7/10/2020 8:27 AM      HISTORY: Ganglion cyst    COMPARISON: None    FINDINGS: 3 views of left hand.    No acute osseous abnormality. No erosion. Mild triscaphe and first  carpometacarpal joint degenerative change. Degenerative change of the  first and second metacarpophalangeal joints. Cystic changes in the  proximal lunate. Distal radioulnar joint degenerative change.    Rounded soft tissue prominence ulnar to the distal shaft of the third  proximal phalanx measuring up to 11 mm.       Impression    IMPRESSION:   1. Rounded soft tissue prominence ulnar to the distal shaft of the  third proximal phalanx consistent with provided history of ganglion  cyst.    2. Polyarticular degenerative change.         I have personally reviewed the examination and initial interpretation  and I agree with the findings.    MABEL BARNES MD (Joe)     Recent Labs   Lab Test 12/28/20  1049 07/08/20  0855 12/09/19  0704 06/18/19  1527 12/18/18  1000 06/20/18  1111   PSA 0.38 0.38 0.40 0.42 0.30 0.28   TESTOSTTOTAL 229* 172* 202*  --  157* 223*      ASSESSMENT AND PLAN   1. Hormone sensitive prostate cancer - [place] 3+4     s/p definitive radiation therapy 7800 cGy in 39 fractions from 6/9-8/3/15    ADT with 3 doses from Oct 2014 through April 2015  2. ECOG PS 0  3. No medical comorbidities  4. Followed every 6 months for surveillance      Bill is doing well. He has no new complains. He continues to do well. He had labs done locally. He had labs drawn last month. He has looked at the results. I reviewed all of them with him. All of them are essentially normal except for mildly low testosterone which is not new  and infact improved from last time. He is not symptomatic from this. His LDL was marginally higher at 120 at the last visit and has now dropped to 88-well within the normal range.  He has successfully lost about 25 pounds.  His lipid panel normalized after the weight loss.    I will continue to see him in 6 months with PSA and testosterone. I explained him that irrespective of PSA value (unless it is dramatically different), we will not be acting on it at this time. We will continue to follow this. His PSA values for last year have been stable.     I have refilled his tamsulosin.     Video-Visit Details    Type of service:  Video Visit  Originating Location (pt. Location): Home  Distant Location (provider location): [facility] CANCER [place]   Platform used for Video Visit: EduKart    20 minutes spent on the date of the encounter doing chart review, history and exam, documentation and further activities as noted above      Franky Arroyo    Hematologist and Medical Oncologist  [facility]

## 2021-01-06 ENCOUNTER — PATIENT OUTREACH (OUTPATIENT)
Dept: ONCOLOGY | Facility: CLINIC | Age: 76
End: 2021-01-06

## 2021-01-06 DIAGNOSIS — C61 MALIGNANT NEOPLASM OF PROSTATE (H): Primary | ICD-10-CM

## 2021-01-06 RX ORDER — TAMSULOSIN HYDROCHLORIDE 0.4 MG/1
0.8 CAPSULE ORAL DAILY
Qty: 180 CAPSULE | Refills: 3 | Status: SHIPPED | OUTPATIENT
Start: 2021-01-06 | End: 2021-12-16

## 2021-01-06 RX ORDER — TAMSULOSIN HYDROCHLORIDE 0.4 MG/1
CAPSULE ORAL
Qty: 180 CAPSULE | Refills: 3 | Status: SHIPPED | OUTPATIENT
Start: 2021-01-06 | End: 2021-12-16

## 2021-01-14 ENCOUNTER — MYC MEDICAL ADVICE (OUTPATIENT)
Dept: INTERNAL MEDICINE | Facility: CLINIC | Age: 76
End: 2021-01-14

## 2021-01-14 DIAGNOSIS — H40.9 GLAUCOMA, UNSPECIFIED GLAUCOMA TYPE, UNSPECIFIED LATERALITY: Primary | ICD-10-CM

## 2021-02-22 ENCOUNTER — MYC MEDICAL ADVICE (OUTPATIENT)
Dept: INTERNAL MEDICINE | Facility: CLINIC | Age: 76
End: 2021-02-22

## 2021-03-31 ENCOUNTER — HOSPITAL ENCOUNTER (OUTPATIENT)
Facility: AMBULATORY SURGERY CENTER | Age: 76
End: 2021-03-31
Attending: ORTHOPAEDIC SURGERY
Payer: MEDICARE

## 2021-03-31 ENCOUNTER — VIRTUAL VISIT (OUTPATIENT)
Dept: ORTHOPEDICS | Facility: CLINIC | Age: 76
End: 2021-03-31
Payer: MEDICARE

## 2021-03-31 DIAGNOSIS — R22.32 MASS OF FINGER OF LEFT HAND: Primary | ICD-10-CM

## 2021-03-31 DIAGNOSIS — R22.32 MASS OF FINGER OF LEFT HAND: ICD-10-CM

## 2021-03-31 PROCEDURE — 99213 OFFICE O/P EST LOW 20 MIN: CPT | Mod: 95 | Performed by: ORTHOPAEDIC SURGERY

## 2021-03-31 NOTE — PROGRESS NOTES
Alvin is a 76 year old who is being evaluated via a billable video visit.      How would you like to obtain your AVS? MyChart  If the video visit is dropped, the invitation should be resent by: Text to cell phone: [phone]  Will anyone else be joining your video visit? No      Video Start Time: 8:19 AM  Video-Visit Details    Type of service:  Video Visit    Video End Time:8:19 AM    Originating Location (pt. Location): Home    Distant Location (provider location):  [facility]THOPEDIC [facility]     Platform used for Video Visit: Zuhair      Had a video chat with Alvin this morning regarding his left middle finger.  He has a mass, probably an inclusion cyst, over the left middle finger, dorsal aspect.  Its been there for a number of years.  Becoming more symptomatic.  No fevers or chills.  No drainage.    Examination over the video link demonstrated full range of motion of the hand.  There is a mass, 1 to 2 cm in circumference dorsal aspect of the middle finger on the left hand.  Bilaterally, no motor, no sensory deficits are noted.  No significant atrophy.  There is brisk capillary refill in all digits and a palpable radial pulse.  No overlying skin changes noted.    Impression: Mass left middle finger, possibly inclusion cyst    Plan: He would like to have this excised.  We discussed the risks and benefits of surgery as well as anticipated perioperative course.  These risks include but are not limited to recurrence, infection, bleeding, stiffness, scarring, injury to blood vessels, tendons, nerves.  All questions were answered.  Arrangements for surgery will be made at their earliest possible convenience.  
59

## 2021-03-31 NOTE — NURSING NOTE
Reason For Visit:   Chief Complaint   Patient presents with     Consult For     Left hand ganglion cyst       Primary MD: Joey Davis    ?  No    Age: 76 year old    Occupation: [employer].  Currently working? No.  Work status?  Part-time.  Date of surgery: NA  Type of surgery: NA.  Smoker: No  Request smoking cessation information: No      There were no vitals taken for this visit.      Pain Assessment  Patient Currently in Pain: Denies(If the cyst is bumped)               QuickDASH Assessment  No flowsheet data found.       Allergies   Allergen Reactions     Cats Cough and Itching     Simvastatin      Aches, low energy, not feeling well       Leonora Hopson, ATC

## 2021-03-31 NOTE — LETTER
3/31/2021         RE: Jam Nice  [address]  [facility] [address]        Dear Colleague,    Thank you for referring your patient, Jam Nice, to the [facility]. Please see a copy of my visit note below.    Alvin is a 76 year old who is being evaluated via a billable video visit.      How would you like to obtain your AVS? MyChart  If the video visit is dropped, the invitation should be resent by: Text to cell phone: [phone]  Will anyone else be joining your video visit? No      Video Start Time: 8:19 AM  Video-Visit Details    Type of service:  Video Visit    Video End Time:8:19 AM    Originating Location (pt. Location): Home    Distant Location (provider location):  [facility]     Platform used for Video Visit: Zuhair      Had a video chat with Alvin this morning regarding his left middle finger.  He has a mass, probably an inclusion cyst, over the left middle finger, dorsal aspect.  Its been there for a number of years.  Becoming more symptomatic.  No fevers or chills.  No drainage.    Examination over the video link demonstrated full range of motion of the hand.  There is a mass, 1 to 2 cm in circumference dorsal aspect of the middle finger on the left hand.  Bilaterally, no motor, no sensory deficits are noted.  No significant atrophy.  There is brisk capillary refill in all digits and a palpable radial pulse.  No overlying skin changes noted.    Impression: Mass left middle finger, possibly inclusion cyst    Plan: He would like to have this excised.  We discussed the risks and benefits of surgery as well as anticipated perioperative course.  These risks include but are not limited to recurrence, infection, bleeding, stiffness, scarring, injury to blood vessels, tendons, nerves.  All questions were answered.  Arrangements for surgery will be made at their earliest possible convenience.        Ish Solorio  MD Rita

## 2021-03-31 NOTE — NURSING NOTE
Teaching Flowsheet   Pre-operative teaching completed via telephone   Relevant Diagnosis: Mass of finger of left hand  Teaching Topic: Excision, mass, left middle finger    CSC under local anesthetic with Dr Ish Salinas  BMI 28.64    Person(s) involved in teaching:   Patient     Motivation Level:  Asks Questions: Yes  Eager to Learn: Yes  Cooperative: Yes  Receptive (willing/able to accept information): Yes  Any cultural factors/Evangelical beliefs that may influence understanding or compliance? No    Patient demonstrates understanding of the following:  Reason for the appointment, diagnosis and treatment plan: Yes  Knowledge of proper use of medications and conditions for which they are ordered (with special attention to potential side effects or drug interactions): Yes  Which situations necessitate calling provider and whom to contact: Yes    Teaching Concerns Addressed:   Proper use and care of  (medical equip, care aids, etc.): Yes  Nutritional needs and diet plan: Yes  Pain management techniques: Yes  Wound Care: Yes  How and/when to access community resources: Yes     Instructional Materials Used/Given:   Preoperative teaching packet mailed to patient. Patient provided RN direct number if any questions.  Komal Gilbert RN

## 2021-04-23 DIAGNOSIS — Z11.59 ENCOUNTER FOR SCREENING FOR OTHER VIRAL DISEASES: ICD-10-CM

## 2021-05-06 ENCOUNTER — TELEPHONE (OUTPATIENT)
Dept: OTOLARYNGOLOGY | Facility: CLINIC | Age: 76
End: 2021-05-06

## 2021-05-06 NOTE — TELEPHONE ENCOUNTER
[facility] Call Center    Phone Message    May a detailed message be left on voicemail: yes     Reason for Call: Other: Pt called in to schedule his 2 year Follow-up Appt with Dr Nissen (rec'd recall letter) - I only had Tuesdays coming up available and he said a Tuesday would not work out for him - please call back to see if Dr Nissen works any days besides Tuesdays to see him or maybe if could see someone else not on a Tuesday -   Pt said Tuesdays would not work out so he had to rethink scheduling from recall letter - he just hung up - but I tried to advise him clinic would call back to discuss but unsure if he heard me or not - Thanks for calling back to try to find a different day of week for Dr Nissen or different provider so we can schedule Pt - Thank you        Action Taken: Message routed to:  [facility] ENT    Travel Screening: Not Applicable

## 2021-06-28 DIAGNOSIS — C61 MALIGNANT NEOPLASM OF PROSTATE (H): ICD-10-CM

## 2021-06-28 DIAGNOSIS — E78.5 HYPERLIPIDEMIA, UNSPECIFIED HYPERLIPIDEMIA TYPE: ICD-10-CM

## 2021-06-28 LAB
BASOPHILS # BLD AUTO: 0 10E9/L (ref 0–0.2)
BASOPHILS NFR BLD AUTO: 0.8 %
DIFFERENTIAL METHOD BLD: ABNORMAL
EOSINOPHIL # BLD AUTO: 0.1 10E9/L (ref 0–0.7)
EOSINOPHIL NFR BLD AUTO: 2.9 %
ERYTHROCYTE [DISTWIDTH] IN BLOOD BY AUTOMATED COUNT: 13.1 % (ref 10–15)
HCT VFR BLD AUTO: 43.7 % (ref 40–53)
HGB BLD-MCNC: 13.6 G/DL (ref 13.3–17.7)
LYMPHOCYTES # BLD AUTO: 1.1 10E9/L (ref 0.8–5.3)
LYMPHOCYTES NFR BLD AUTO: 23 %
MCH RBC QN AUTO: 31.3 PG (ref 26.5–33)
MCHC RBC AUTO-ENTMCNC: 31.1 G/DL (ref 31.5–36.5)
MCV RBC AUTO: 101 FL (ref 78–100)
MONOCYTES # BLD AUTO: 0.5 10E9/L (ref 0–1.3)
MONOCYTES NFR BLD AUTO: 9.5 %
NEUTROPHILS # BLD AUTO: 3.1 10E9/L (ref 1.6–8.3)
NEUTROPHILS NFR BLD AUTO: 63.8 %
PLATELET # BLD AUTO: 207 10E9/L (ref 150–450)
RBC # BLD AUTO: 4.34 10E12/L (ref 4.4–5.9)
WBC # BLD AUTO: 4.9 10E9/L (ref 4–11)

## 2021-06-28 PROCEDURE — 80053 COMPREHEN METABOLIC PANEL: CPT | Performed by: INTERNAL MEDICINE

## 2021-06-28 PROCEDURE — 99000 SPECIMEN HANDLING OFFICE-LAB: CPT | Performed by: INTERNAL MEDICINE

## 2021-06-28 PROCEDURE — 84153 ASSAY OF PSA TOTAL: CPT | Performed by: INTERNAL MEDICINE

## 2021-06-28 PROCEDURE — 84403 ASSAY OF TOTAL TESTOSTERONE: CPT | Mod: 90 | Performed by: INTERNAL MEDICINE

## 2021-06-28 PROCEDURE — 36415 COLL VENOUS BLD VENIPUNCTURE: CPT | Performed by: INTERNAL MEDICINE

## 2021-06-28 PROCEDURE — 85025 COMPLETE CBC W/AUTO DIFF WBC: CPT | Performed by: INTERNAL MEDICINE

## 2021-06-28 PROCEDURE — 80061 LIPID PANEL: CPT | Performed by: INTERNAL MEDICINE

## 2021-06-29 LAB
ALBUMIN SERPL-MCNC: 3.7 G/DL (ref 3.4–5)
ALP SERPL-CCNC: 61 U/L (ref 40–150)
ALT SERPL W P-5'-P-CCNC: 22 U/L (ref 0–70)
ANION GAP SERPL CALCULATED.3IONS-SCNC: 3 MMOL/L (ref 3–14)
AST SERPL W P-5'-P-CCNC: 15 U/L (ref 0–45)
BILIRUB SERPL-MCNC: 0.4 MG/DL (ref 0.2–1.3)
BUN SERPL-MCNC: 28 MG/DL (ref 7–30)
CALCIUM SERPL-MCNC: 8.7 MG/DL (ref 8.5–10.1)
CHLORIDE SERPL-SCNC: 110 MMOL/L (ref 94–109)
CHOLEST SERPL-MCNC: 176 MG/DL
CO2 SERPL-SCNC: 26 MMOL/L (ref 20–32)
CREAT SERPL-MCNC: 0.9 MG/DL (ref 0.66–1.25)
GFR SERPL CREATININE-BSD FRML MDRD: 83 ML/MIN/{1.73_M2}
GLUCOSE SERPL-MCNC: 81 MG/DL (ref 70–99)
HDLC SERPL-MCNC: 60 MG/DL
LDLC SERPL CALC-MCNC: 101 MG/DL
NONHDLC SERPL-MCNC: 116 MG/DL
POTASSIUM SERPL-SCNC: 4.5 MMOL/L (ref 3.4–5.3)
PROT SERPL-MCNC: 6.7 G/DL (ref 6.8–8.8)
PSA SERPL-MCNC: 0.36 UG/L (ref 0–4)
SODIUM SERPL-SCNC: 139 MMOL/L (ref 133–144)
TESTOST SERPL-MCNC: 172 NG/DL (ref 240–950)
TRIGL SERPL-MCNC: 73 MG/DL

## 2021-07-03 NOTE — PROGRESS NOTES
I have reviewed all of the labs done prior to this clinic visit.  Labs are all completely normal including electrolytes, renal function, hepatic panel, complete blood count and differential.      His PSA is stable and we would continue following him up every 6 months.     Franky Arroyo    Hematologist and Medical Oncologist  [facility]     Recent Labs   Lab Test 06/28/21  0833 12/28/20  1049 07/08/20  0855 12/09/19  0704 01/09/19  0724 12/18/18  1000    140 140 141  --  140   POTASSIUM 4.5 4.1 5.2 4.5  --  4.0   CHLORIDE 110* 110* 109 110*  --  108   CO2 26 27 29 29  --  23   ANIONGAP 3 3 2* 2*  --  9   BUN 28 22 24 23  --  24   CR 0.90 0.83 0.85 0.93  --  0.76   GLC 81 101* 81 101* 97 157*   LUZ 8.7 8.9 8.8 9.1  --  8.4*     No results for input(s): MAG, PHOS in the last 45741 hours.  Recent Labs   Lab Test 06/28/21  0833 12/28/20  1049 07/08/20  0855 12/09/19  0704 12/18/18  1000 06/20/18  1111   WBC 4.9 5.9 6.2 4.9 5.9 5.6   HGB 13.6 13.7 14.4 14.2 13.9 14.6    238 219 209 192 183   * 101* 100 100 96 97   NEUTROPHIL 63.8 66.7 67.2  --  71.9 73.1     Recent Labs   Lab Test 06/28/21  0833 12/28/20  1049 07/08/20  0855   BILITOTAL 0.4 0.6 0.5   ALKPHOS 61 72 65   ALT 22 24 20   AST 15 16 13   ALBUMIN 3.7 3.9 3.8     No results found for: TSH  No results for input(s): CEA in the last 80021 hours.  Results for orders placed or performed in visit on 07/10/20   XR Hand Left G/E 3 Views    Narrative    EXAM: XR HAND LT G/E 3 VW  7/10/2020 8:27 AM      HISTORY: Ganglion cyst    COMPARISON: None    FINDINGS: 3 views of left hand.    No acute osseous abnormality. No erosion. Mild triscaphe and first  carpometacarpal joint degenerative change. Degenerative change of the  first and second metacarpophalangeal joints. Cystic changes in the  proximal lunate. Distal radioulnar joint degenerative change.    Rounded soft tissue prominence ulnar to the distal shaft of the third  proximal phalanx measuring up  to 11 mm.       Impression    IMPRESSION:   1. Rounded soft tissue prominence ulnar to the distal shaft of the  third proximal phalanx consistent with provided history of ganglion  cyst.    2. Polyarticular degenerative change.         I have personally reviewed the examination and initial interpretation  and I agree with the findings.    MABEL BARNES MD (Joe)

## 2021-07-28 DIAGNOSIS — H90.3 BILATERAL SENSORINEURAL HEARING LOSS: Primary | ICD-10-CM

## 2021-08-09 NOTE — PATIENT INSTRUCTIONS
1. You were seen in the ENT Clinic today by Dr. Nissen.  If you have any questions or concerns after your appointment, please call   - Option 1: ENT Clinic: [phone]   - Option 2: Beth (Dr. Nissen's Nurse): [phone]                   Nimo(Dr. Nissen's Nurse): [phone]      2.   Plan to return to clinic in 2 years with hearing test  Beth Boyd LPN  [facility] - Otolaryngology

## 2021-08-10 ENCOUNTER — OFFICE VISIT (OUTPATIENT)
Dept: AUDIOLOGY | Facility: CLINIC | Age: 76
End: 2021-08-10
Payer: MEDICARE

## 2021-08-10 ENCOUNTER — OFFICE VISIT (OUTPATIENT)
Dept: OTOLARYNGOLOGY | Facility: CLINIC | Age: 76
End: 2021-08-10
Payer: MEDICARE

## 2021-08-10 VITALS
WEIGHT: 177 LBS | OXYGEN SATURATION: 98 % | HEIGHT: 72 IN | BODY MASS INDEX: 23.98 KG/M2 | HEART RATE: 59 BPM | TEMPERATURE: 97.5 F

## 2021-08-10 DIAGNOSIS — H90.3 BILATERAL SENSORINEURAL HEARING LOSS: ICD-10-CM

## 2021-08-10 DIAGNOSIS — H93.8X3 EAR PRESSURE, BILATERAL: ICD-10-CM

## 2021-08-10 DIAGNOSIS — H93.13 TINNITUS, BILATERAL: ICD-10-CM

## 2021-08-10 DIAGNOSIS — H90.3 BILATERAL SENSORINEURAL HEARING LOSS: Primary | ICD-10-CM

## 2021-08-10 DIAGNOSIS — H61.23 EXCESSIVE CERUMEN IN BOTH EAR CANALS: ICD-10-CM

## 2021-08-10 PROCEDURE — 92550 TYMPANOMETRY & REFLEX THRESH: CPT | Performed by: AUDIOLOGIST

## 2021-08-10 PROCEDURE — 92504 EAR MICROSCOPY EXAMINATION: CPT | Performed by: OTOLARYNGOLOGY

## 2021-08-10 PROCEDURE — 92557 COMPREHENSIVE HEARING TEST: CPT | Performed by: AUDIOLOGIST

## 2021-08-10 PROCEDURE — 99214 OFFICE O/P EST MOD 30 MIN: CPT | Mod: 25 | Performed by: OTOLARYNGOLOGY

## 2021-08-10 ASSESSMENT — PAIN SCALES - GENERAL: PAINLEVEL: NO PAIN (0)

## 2021-08-10 ASSESSMENT — MIFFLIN-ST. JEOR: SCORE: 1571.9

## 2021-08-10 NOTE — PROGRESS NOTES
Dear Joey Espino:    I had the pleasure of seeing Jam Nice in followup today at the [facility].    CHIEF COMPLAINT: Ears, hearing loss    HISTORY OF PRESENT ILLNESS: Patient is a 76-year-old in today for follow-up on his ears and hearing loss.  He has noticed this for some years.  We have been monitoring.  Has not been around any significant noise exposure, worked in an office setting.  He feels his hearing is still symmetrical.  He has not tried or obtained hearing aids.  There is been no pain or drainage.  He does have pressure in the ears but not problematic.  He does have bilateral tinnitus.  Denies any dizziness or balance concerns.  He currently is working part-time, about 10 hours a week at the [employer].  He denies any dysphagia, hoarseness, facial paresthesias.    MEDICATIONS: Please refer to the detailed medication reconciliation performed by my nurse today, which I have reviewed and signed.     ALLERGIES:    Allergies   Allergen Reactions     Cats Cough and Itching     Simvastatin      Aches, low energy, not feeling well       HABITS: Social History    Substance and Sexual Activity      Alcohol use: Not Currently     History   Smoking Status     Former Smoker     Packs/day: 1.50     Years: 10.00     Types: Cigarettes     Start date: 1964     Quit date: 1985   Smokeless Tobacco     Never Used         PAST MEDICAL HISTORY:  Please see today's intake form (for the remainder of the PMH) which I reviewed and signed.  Past Medical History:   Diagnosis Date     Basal cell carcinoma of skin      Hearing problem Aug 2015     History of radiation therapy     Prostate     Hyperlipidemia     statin intolerant     Prostate cancer (H) 2012    S/P radiation rx     Reduced vision      Tinnitus     Ongoing       FAMILY HISTORY/SOCIAL HISTORY:    Family History   Problem Relation Age of Onset     Alcoholism Mother          90     Cerebrovascular  Disease Mother         Possible     Substance Abuse Mother      Migraines Mother      Stomach Problem Mother      Coronary Artery Disease Father          51 MI     Heart Disease Father          at age 59     Substance Abuse Father      Alcoholism Sister          55     Cancer Sister      Substance Abuse Sister      Substance Abuse Sister      Dementia Sister         Recently diagnosed      Social History     Socioeconomic History     Marital status:      Spouse name: Not on file     Number of children: Not on file     Years of education: Not on file     Highest education level: Not on file   Occupational History     Occupation: retired     Employer: [employer]   Tobacco Use     Smoking status: Former Smoker     Packs/day: 1.50     Years: 10.00     Pack years: 15.00     Types: Cigarettes     Start date: 1964     Quit date: 1985     Years since quittin.6     Smokeless tobacco: Never Used   Substance and Sexual Activity     Alcohol use: Not Currently     Drug use: No     Sexual activity: Never   Other Topics Concern     Not on file   Social History Narrative     Not on file     Social Determinants of Health     Financial Resource Strain:      Difficulty of Paying Living Expenses:    Food Insecurity:      Worried About Running Out of Food in the Last Year:      Ran Out of Food in the Last Year:    Transportation Needs:      Lack of Transportation (Medical):      Lack of Transportation (Non-Medical):    Physical Activity:      Days of Exercise per Week:      Minutes of Exercise per Session:    Stress:      Feeling of Stress :    Social Connections:      Frequency of Communication with Friends and Family:      Frequency of Social Gatherings with Friends and Family:      Attends Christianity Services:      Active Member of Clubs or Organizations:      Attends Club or Organization Meetings:      Marital Status:    Intimate Partner Violence:      Fear of Current or Ex-Partner:      Emotionally  Abused:      Physically Abused:      Sexually Abused:        REVIEW OF SYSTEMS: [unfilled]    The remainder of the 10 point ROS is negative    PHYSICIAL EXAMINATION:  Constitutional: The patient was well-groomed and in no acute distress.   Skin: Warm and pink.  Psychiatric: The patient's affect was calm, cooperative, and appropriate.   Respiratory: Breathing comfortably without stridor or exertion of accessory muscles.  Eyes: Pupils were equal and reactive. Extraocular movement intact.   Head: Normocephalic and atraumatic. No lesions or scars.  Ears: Patient placed under the microscope for microscopic evaluation and cleaning of cerumen which was obscuring full visualization and complete assessment of both TMs. Under high power magnification, the right ear was examined and cleaned of cerumen using curet, alligator forceps, and suction.  After cleaning, TM is fully visualized and has normal position with normal middle ear aeration. The left ear was then cleaned and inspected using microscope, instruments and similar techniques. After cleaning of cerumen, the TM has normal position with normal aeration to middle ear.  Nose: Sinuses were nontender. Anterior rhinoscopy revealed midline septum and absence of purulence or polyps.  Oral Cavity: Normal tongue, floor of mouth, buccal mucosa, and palate. No abnormal lymph tissue in the oropharynx.   Neck: The parotid is soft without masses. Supple with normal laryngeal and tracheal landmarks.   Lymphatic: There is no palpable lymphadenopathy or other masses in the neck.   Neurologic: Alert and oriented x 3. Cranial nerves III-XI within normal limits. Voice quality normal.  Cerebellar Function Tests:  Grossly normal    Audiogram: Audiogram shows a bilateral high-frequency sensorineural hearing loss above 2000 Hz, down to the moderate to severe range, maintains excellent discrimination at 96% on the right and 100% on the left.  Fairly stable from before.  Normal type A  tympanograms in each ear.    IMPRESSION AND PLAN:   1. Bilateral sensorineural hearing loss: Stable, discussed this with him in detail.  Protect ears from noise.  Discussed option for hearing aids which again he will pursue at his discretion in place of location.  2. Bilateral tinnitus: Secondary to sensorineural hearing loss.  If problematic, hearing aids best option to treat tinnitus as well, not problematic at this time but will keep that in mind.  3. Bilateral ear pressure: Probably secondary to sensorineural hearing loss and delusional pressure.  Not problematic, no treatment needed.  4. Excessive cerumen: Cleaned today, no further treatment needed, monitor.    Recommended monitor and follow-up in 2 years.  To come in sooner with any increased issues or problems.  We will pursue hearing aids at his discretion in place of location.    Thank you very much for the opportunity to participate in the care of your patient.    Rick L Nissen MD

## 2021-08-10 NOTE — LETTER
8/10/2021       RE: Jam Nice  [address]  [place] [place] [address]     Dear Colleague,    Thank you for referring your patient, Jam Nice, to the [facility] [place] [facility]. Please see a copy of my visit note below.    Dear Joey Espino:    I had the pleasure of seeing Jam Nice in followup today at the [facility].    CHIEF COMPLAINT: Ears, hearing loss    HISTORY OF PRESENT ILLNESS: Patient is a 76-year-old in today for follow-up on his ears and hearing loss.  He has noticed this for some years.  We have been monitoring.  Has not been around any significant noise exposure, worked in an office setting.  He feels his hearing is still symmetrical.  He has not tried or obtained hearing aids.  There is been no pain or drainage.  He does have pressure in the ears but not problematic.  He does have bilateral tinnitus.  Denies any dizziness or balance concerns.  He currently is working part-time, about 10 hours a week at the [employer].  He denies any dysphagia, hoarseness, facial paresthesias.    MEDICATIONS: Please refer to the detailed medication reconciliation performed by my nurse today, which I have reviewed and signed.     ALLERGIES:    Allergies   Allergen Reactions     Cats Cough and Itching     Simvastatin      Aches, low energy, not feeling well       HABITS: Social History    Substance and Sexual Activity      Alcohol use: Not Currently     History   Smoking Status     Former Smoker     Packs/day: 1.50     Years: 10.00     Types: Cigarettes     Start date: 1/1/1964     Quit date: 1/1/1985   Smokeless Tobacco     Never Used         PAST MEDICAL HISTORY:  Please see today's intake form (for the remainder of the PMH) which I reviewed and signed.  Past Medical History:   Diagnosis Date     Basal cell carcinoma of skin      Hearing problem Aug 2015      History of radiation therapy     Prostate     Hyperlipidemia     statin intolerant     Prostate cancer (H) 2012    S/P radiation rx     Reduced vision      Tinnitus     Ongoing       FAMILY HISTORY/SOCIAL HISTORY:    Family History   Problem Relation Age of Onset     Alcoholism Mother          90     Cerebrovascular Disease Mother         Possible     Substance Abuse Mother      Migraines Mother      Stomach Problem Mother      Coronary Artery Disease Father          51 MI     Heart Disease Father          at age 59     Substance Abuse Father      Alcoholism Sister          55     Cancer Sister      Substance Abuse Sister      Substance Abuse Sister      Dementia Sister         Recently diagnosed      Social History     Socioeconomic History     Marital status:      Spouse name: Not on file     Number of children: Not on file     Years of education: Not on file     Highest education level: Not on file   Occupational History     Occupation: retired     Employer: [employer]   Tobacco Use     Smoking status: Former Smoker     Packs/day: 1.50     Years: 10.00     Pack years: 15.00     Types: Cigarettes     Start date: 1964     Quit date: 1985     Years since quittin.6     Smokeless tobacco: Never Used   Substance and Sexual Activity     Alcohol use: Not Currently     Drug use: No     Sexual activity: Never   Other Topics Concern     Not on file   Social History Narrative     Not on file     Social Determinants of Health     Financial Resource Strain:      Difficulty of Paying Living Expenses:    Food Insecurity:      Worried About Running Out of Food in the Last Year:      Ran Out of Food in the Last Year:    Transportation Needs:      Lack of Transportation (Medical):      Lack of Transportation (Non-Medical):    Physical Activity:      Days of Exercise per Week:      Minutes of Exercise per Session:    Stress:      Feeling of Stress :    Social Connections:      Frequency of  Communication with Friends and Family:      Frequency of Social Gatherings with Friends and Family:      Attends Tenriism Services:      Active Member of Clubs or Organizations:      Attends Club or Organization Meetings:      Marital Status:    Intimate Partner Violence:      Fear of Current or Ex-Partner:      Emotionally Abused:      Physically Abused:      Sexually Abused:        REVIEW OF SYSTEMS: [unfilled]    The remainder of the 10 point ROS is negative    PHYSICIAL EXAMINATION:  Constitutional: The patient was well-groomed and in no acute distress.   Skin: Warm and pink.  Psychiatric: The patient's affect was calm, cooperative, and appropriate.   Respiratory: Breathing comfortably without stridor or exertion of accessory muscles.  Eyes: Pupils were equal and reactive. Extraocular movement intact.   Head: Normocephalic and atraumatic. No lesions or scars.  Ears: Patient placed under the microscope for microscopic evaluation and cleaning of cerumen which was obscuring full visualization and complete assessment of both TMs. Under high power magnification, the right ear was examined and cleaned of cerumen using curet, alligator forceps, and suction.  After cleaning, TM is fully visualized and has normal position with normal middle ear aeration. The left ear was then cleaned and inspected using microscope, instruments and similar techniques. After cleaning of cerumen, the TM has normal position with normal aeration to middle ear.  Nose: Sinuses were nontender. Anterior rhinoscopy revealed midline septum and absence of purulence or polyps.  Oral Cavity: Normal tongue, floor of mouth, buccal mucosa, and palate. No abnormal lymph tissue in the oropharynx.   Neck: The parotid is soft without masses. Supple with normal laryngeal and tracheal landmarks.   Lymphatic: There is no palpable lymphadenopathy or other masses in the neck.   Neurologic: Alert and oriented x 3. Cranial nerves III-XI within normal limits. Voice  quality normal.  Cerebellar Function Tests:  Grossly normal    Audiogram: Audiogram shows a bilateral high-frequency sensorineural hearing loss above 2000 Hz, down to the moderate to severe range, maintains excellent discrimination at 96% on the right and 100% on the left.  Fairly stable from before.  Normal type A tympanograms in each ear.    IMPRESSION AND PLAN:   1. Bilateral sensorineural hearing loss: Stable, discussed this with him in detail.  Protect ears from noise.  Discussed option for hearing aids which again he will pursue at his discretion in place of location.  2. Bilateral tinnitus: Secondary to sensorineural hearing loss.  If problematic, hearing aids best option to treat tinnitus as well, not problematic at this time but will keep that in mind.  3. Bilateral ear pressure: Probably secondary to sensorineural hearing loss and delusional pressure.  Not problematic, no treatment needed.  4. Excessive cerumen: Cleaned today, no further treatment needed, monitor.    Recommended monitor and follow-up in 2 years.  To come in sooner with any increased issues or problems.  We will pursue hearing aids at his discretion in place of location.    Thank you very much for the opportunity to participate in the care of your patient.    Rick L Nissen MD

## 2021-08-10 NOTE — NURSING NOTE
"Chief Complaint   Patient presents with     RECHECK     2 tear follow up with [facility] prior       Pulse 59, temperature 97.5  F (36.4  C), temperature source Temporal, height 1.822 m (5' 11.75\"), weight 80.3 kg (177 lb), SpO2 98 %.    Lidia Horn, EMT    "

## 2021-08-10 NOTE — PROGRESS NOTES
AUDIOLOGY REPORT    SUMMARY: Audiology visit completed. See audiogram for results.      RECOMMENDATIONS: Follow-up with ENT.      Bora Herndon.  Licensed Audiologist  [place] #[id number]

## 2021-09-10 ENCOUNTER — MYC MEDICAL ADVICE (OUTPATIENT)
Dept: INTERNAL MEDICINE | Facility: CLINIC | Age: 76
End: 2021-09-10

## 2021-09-25 ENCOUNTER — MYC MEDICAL ADVICE (OUTPATIENT)
Dept: INTERNAL MEDICINE | Facility: CLINIC | Age: 76
End: 2021-09-25

## 2021-11-20 ENCOUNTER — HEALTH MAINTENANCE LETTER (OUTPATIENT)
Age: 76
End: 2021-11-20

## 2021-12-06 ENCOUNTER — LAB (OUTPATIENT)
Dept: LAB | Facility: CLINIC | Age: 76
End: 2021-12-06
Payer: MEDICARE

## 2021-12-06 DIAGNOSIS — E78.5 HYPERLIPIDEMIA, UNSPECIFIED HYPERLIPIDEMIA TYPE: ICD-10-CM

## 2021-12-06 DIAGNOSIS — C61 MALIGNANT NEOPLASM OF PROSTATE (H): ICD-10-CM

## 2021-12-06 LAB
BASOPHILS # BLD AUTO: 0 10E3/UL (ref 0–0.2)
BASOPHILS NFR BLD AUTO: 1 %
EOSINOPHIL # BLD AUTO: 0.1 10E3/UL (ref 0–0.7)
EOSINOPHIL NFR BLD AUTO: 2 %
ERYTHROCYTE [DISTWIDTH] IN BLOOD BY AUTOMATED COUNT: 13 % (ref 10–15)
HCT VFR BLD AUTO: 41.4 % (ref 35–53)
HGB BLD-MCNC: 13.5 G/DL (ref 11.7–17.7)
LYMPHOCYTES # BLD AUTO: 1 10E3/UL (ref 0.8–5.3)
LYMPHOCYTES NFR BLD AUTO: 23 %
MCH RBC QN AUTO: 32.7 PG (ref 26.5–33)
MCHC RBC AUTO-ENTMCNC: 32.6 G/DL (ref 31.5–36.5)
MCV RBC AUTO: 100 FL (ref 78–100)
MONOCYTES # BLD AUTO: 0.5 10E3/UL (ref 0–1.3)
MONOCYTES NFR BLD AUTO: 11 %
NEUTROPHILS # BLD AUTO: 2.7 10E3/UL (ref 1.6–8.3)
NEUTROPHILS NFR BLD AUTO: 63 %
PLATELET # BLD AUTO: 194 10E3/UL (ref 150–450)
RBC # BLD AUTO: 4.13 10E6/UL (ref 3.8–5.9)
WBC # BLD AUTO: 4.3 10E3/UL (ref 4–11)

## 2021-12-06 PROCEDURE — 80061 LIPID PANEL: CPT

## 2021-12-06 PROCEDURE — 80053 COMPREHEN METABOLIC PANEL: CPT

## 2021-12-06 PROCEDURE — 84403 ASSAY OF TOTAL TESTOSTERONE: CPT

## 2021-12-06 PROCEDURE — 85025 COMPLETE CBC W/AUTO DIFF WBC: CPT

## 2021-12-06 PROCEDURE — 36415 COLL VENOUS BLD VENIPUNCTURE: CPT

## 2021-12-06 PROCEDURE — 84153 ASSAY OF PSA TOTAL: CPT

## 2021-12-07 LAB
ALBUMIN SERPL-MCNC: 3.6 G/DL (ref 3.4–5)
ALP SERPL-CCNC: 60 U/L (ref 40–150)
ALT SERPL W P-5'-P-CCNC: 18 U/L (ref 0–70)
ANION GAP SERPL CALCULATED.3IONS-SCNC: 4 MMOL/L (ref 3–14)
AST SERPL W P-5'-P-CCNC: 12 U/L (ref 0–45)
BILIRUB SERPL-MCNC: 0.5 MG/DL (ref 0.2–1.3)
BUN SERPL-MCNC: 31 MG/DL (ref 7–30)
CALCIUM SERPL-MCNC: 8.4 MG/DL (ref 8.5–10.1)
CHLORIDE BLD-SCNC: 107 MMOL/L (ref 94–109)
CO2 SERPL-SCNC: 28 MMOL/L (ref 20–32)
CREAT SERPL-MCNC: 0.79 MG/DL (ref 0.52–1.25)
GFR SERPL CREATININE-BSD FRML MDRD: 88 ML/MIN/1.73M2
GLUCOSE BLD-MCNC: 70 MG/DL (ref 70–99)
POTASSIUM BLD-SCNC: 4.2 MMOL/L (ref 3.4–5.3)
PROT SERPL-MCNC: 6.9 G/DL (ref 6.8–8.8)
PSA SERPL-MCNC: 0.33 UG/L (ref 0–4)
SODIUM SERPL-SCNC: 139 MMOL/L (ref 133–144)

## 2021-12-08 LAB — TESTOST SERPL-MCNC: 165 NG/DL (ref 8–950)

## 2021-12-15 ENCOUNTER — VIRTUAL VISIT (OUTPATIENT)
Dept: ONCOLOGY | Facility: CLINIC | Age: 76
End: 2021-12-15
Attending: INTERNAL MEDICINE
Payer: MEDICARE

## 2021-12-15 DIAGNOSIS — R35.1 BENIGN PROSTATIC HYPERPLASIA WITH NOCTURIA: ICD-10-CM

## 2021-12-15 DIAGNOSIS — C61 MALIGNANT NEOPLASM OF PROSTATE (H): ICD-10-CM

## 2021-12-15 DIAGNOSIS — N40.1 BENIGN PROSTATIC HYPERPLASIA WITH NOCTURIA: ICD-10-CM

## 2021-12-15 LAB
CHOLEST SERPL-MCNC: 177 MG/DL
FASTING STATUS PATIENT QL REPORTED: YES
HDLC SERPL-MCNC: 61 MG/DL
LDLC SERPL CALC-MCNC: 101 MG/DL
NONHDLC SERPL-MCNC: 116 MG/DL
TRIGL SERPL-MCNC: 76 MG/DL

## 2021-12-15 PROCEDURE — G0463 HOSPITAL OUTPT CLINIC VISIT: HCPCS | Mod: PN,RTG | Performed by: INTERNAL MEDICINE

## 2021-12-15 PROCEDURE — 999N001193 HC VIDEO/TELEPHONE VISIT; NO CHARGE

## 2021-12-15 PROCEDURE — 99213 OFFICE O/P EST LOW 20 MIN: CPT | Mod: 95 | Performed by: INTERNAL MEDICINE

## 2021-12-15 NOTE — PROGRESS NOTES
Alvin is a 76 year old who is being evaluated via a billable video visit.      Jam Nice stated he is in the state [facility] for the visit today.    How would you like to obtain your AVS? MyChart  If the video visit is dropped, the invitation should be resent by: Send to e-mail at: [email or web address]  Will anyone else be joining your video visit? Jesusita Maciel, Virtual Visit Facilitator

## 2021-12-15 NOTE — NURSING NOTE
Jam Nice verified meds and allergies are correct via patients echeck in. Tamsulosin 0.4mg flagged for removal as this was duplicate order.    Patient requesting Rx refill for Tamsulosin 0.4 mg BID. Pharmacy added.    Sully Maciel, Virtual Facilitator

## 2021-12-15 NOTE — LETTER
12/15/2021         RE: Jam Nice  [address]  [facility] [address]        Dear Colleague,    Thank you for referring your patient, Jam Nice, to the [facility]. Please see a copy of my visit note below.    Alvin is a 76 year old who is being evaluated via a billable video visit.      Jam Nice stated he is in the state of [place] for the visit today.    How would you like to obtain your AVS? MyChart  If the video visit is dropped, the invitation should be resent by: Send to e-mail at: [email or web address]  Will anyone else be joining your video visit? Jesusita Maciel, Virtual Visit Facilitator      [facility]  HEMATOLOGY AND ONCOLOGY    FOLLOW-UP VISIT NOTE    PATIENT NAME: Jam Nice MRN # [id number]  DATE OF VISIT: Dec 15, 2021 YOB: 1945    REFERRING PROVIDER: Referred Self, MD  No address on file    CANCER TYPE: Prostate cancer  STAGE: M0 Stage II with biochemical PSA relapse post radiation therapy with concomitant hormones                                                    TREATMENT SUMMARY:  - Prostate biopsy in 2008 for rising PSA - 4.8; revealed [place] 3+3 disease  -  He received two injections of three-month Zoladex Depot (10/3/08 and 1/5/09) in prep for concurrent radiation therapy but opted for surveillance  - Rpt Prostate biopsy for rising PSA done on 10/13/10 revealed again [place] 3+3 disease  - He had repeat prostate biopsy for rising PSA on 8/26/14 which now revealed [place] 3+4 disease  - He was treated with ADT - Zoladex Depot and recieved 3 doses (10.8 mg) on October 8, 2014, January 9, 2015, and April 2, 2015, respectively.  - He underwent definitive radiation therapy from June 9, 2015 --- August 3, 2015 and received 7800 cGy in 39 fractions.  - He is being followed with PSA every 6 months since then     CURRENT INTERVENTIONS:  Observation    SUBJECTIVE   Jam Nice is being  followed for prostate cancer with biochemical relapse.     He has no new complains. He continues to do well. He has no new concerns at this visit.       PAST MEDICAL HISTORY     Past Medical History:   Diagnosis Date     Basal cell carcinoma of skin      Hearing problem Aug 2015     History of radiation therapy 2015    Prostate     Hyperlipidemia     statin intolerant     Prostate cancer (H) 2012    S/P radiation rx     Reduced vision      Tinnitus     Ongoing         CURRENT OUTPATIENT MEDICATIONS     Current Outpatient Medications   Medication Sig     PSYLLIUM HUSK PO      tamsulosin (FLOMAX) 0.4 MG capsule TAKE ONE CAPSULE BY MOUTH TWICE A DAY     tamsulosin (FLOMAX) 0.4 MG capsule Take 2 capsules (0.8 mg) by mouth daily     triamcinolone (KENALOG) 0.1 % external cream Apply topically 2 times daily     No current facility-administered medications for this visit.        ALLERGIES      Allergies   Allergen Reactions     Cats Cough and Itching     Simvastatin      Aches, low energy, not feeling well        REVIEW OF SYSTEMS   As above in the HPI, o/w complete 12-point ROS was negative.     PHYSICAL EXAM   There were no vitals taken for this visit.  GEN: NAD  HEENT: PERRL, EOMI, no icterus, injection or pallor. Oropharynx is clear.  LYMPHATICs: no cervical or supraclavicular lymphadenopathy; no other abn lymphadenopathy  PULMONARY: clear with good air entry bilaterally  CARDIOVASCULAR: regular, no murmurs, rubs, or gallops  GASTROINTESTINAL: soft, non-tender, non-distended, normal bowel sounds, no hepatosplenomegaly by percussion or palpation  MUSCULOSKELTAL: warm, well perfused, no edema  NEURO: awake, alert and oriented to time place and person, cranial nerves intact - II - XII, no focal neurologic deficits  SKIN: no rashes     LABORATORY AND IMAGING STUDIES     Recent Labs   Lab Test 12/06/21  0816 06/28/21  0833 12/28/20  1049 07/08/20  0855 12/09/19  0704    139 140 140 141   POTASSIUM 4.2 4.5 4.1 5.2 4.5    CHLORIDE 107 110* 110* 109 110*   CO2 28 26 27 29 29   ANIONGAP 4 3 3 2* 2*   BUN 31* 28 22 24 23   CR 0.79 0.90 0.83 0.85 0.93   GLC 70 81 101* 81 101*   LUZ 8.4* 8.7 8.9 8.8 9.1     No results for input(s): MAG, PHOS in the last 75634 hours.  Recent Labs   Lab Test 12/06/21  0816 06/28/21  0833 12/28/20  1049 07/08/20  0855 12/09/19  0704 12/18/18  1000   WBC 4.3 4.9 5.9 6.2 4.9 5.9   HGB 13.5 13.6 13.7 14.4 14.2 13.9    207 238 219 209 192    101* 101* 100 100 96   NEUTROPHIL 63 63.8 66.7 67.2  --  71.9     Recent Labs   Lab Test 12/06/21  0816 06/28/21  0833 12/28/20  1049   BILITOTAL 0.5 0.4 0.6   ALKPHOS 60 61 72   ALT 18 22 24   AST 12 15 16   ALBUMIN 3.6 3.7 3.9         Results for orders placed or performed in visit on 07/10/20   XR Hand Left G/E 3 Views    Narrative    EXAM: XR HAND LT G/E 3 VW  7/10/2020 8:27 AM      HISTORY: Ganglion cyst    COMPARISON: None    FINDINGS: 3 views of left hand.    No acute osseous abnormality. No erosion. Mild triscaphe and first  carpometacarpal joint degenerative change. Degenerative change of the  first and second metacarpophalangeal joints. Cystic changes in the  proximal lunate. Distal radioulnar joint degenerative change.    Rounded soft tissue prominence ulnar to the distal shaft of the third  proximal phalanx measuring up to 11 mm.       Impression    IMPRESSION:   1. Rounded soft tissue prominence ulnar to the distal shaft of the  third proximal phalanx consistent with provided history of ganglion  cyst.    2. Polyarticular degenerative change.         I have personally reviewed the examination and initial interpretation  and I agree with the findings.    MABEL BARNES MD (Joe)     Recent Labs   Lab Test 12/06/21  0816 06/28/21  0833 12/28/20  1049 07/08/20  0855 12/09/19  0704   PSA 0.33 0.36 0.38 0.38 0.40   TESTOSTTOTAL 165 172* 229* 172* 202*      ASSESSMENT AND PLAN   1. Hormone sensitive prostate cancer - [place] 3+4     s/p definitive  radiation therapy 7800 cGy in 39 fractions from 6/9-8/3/15    ADT with 3 doses from Oct 2014 through April 2015  2. ECOG PS 0  3. No medical comorbidities  4. Followed every 6 months for surveillance      Bill is doing well. He has no new complains. He had labs done locally. He had labs drawn last week. He has looked at the results.    I have reviewed all of the labs done prior to this clinic visit.  Labs are all completely normal including electrolytes, renal function, hepatic panel, complete blood count and differential except for borderline hypocalcemia.    His PSA has come back a little lower at 0.33 ng/ml which is essentially the same number as last time in June when it was 0.36 ng/ml.  We will continue to monitor as it remained stable.    I will continue to see him in 6 months with PSA and testosterone. I explained him that irrespective of PSA value (unless it is dramatically different), we will not be acting on it at this time. We will continue to follow this. His PSA values for last couple of years have been stable.     I have refilled his tamsulosin.     Video-Visit Details    Type of service:  Video Visit  Originating Location (pt. Location): Home  Distant Location (provider location): [facility]   Platform used for Video Visit: [employer]    15 minutes spent on the date of the encounter doing chart review, history and exam, documentation and further activities as noted above      Franky Arroyo    Hematologist and Medical Oncologist  [facility]         Again, thank you for allowing me to participate in the care of your patient.        Sincerely,        Franky Arroyo MD

## 2021-12-15 NOTE — PROGRESS NOTES
[facility]  HEMATOLOGY AND ONCOLOGY    FOLLOW-UP VISIT NOTE    PATIENT NAME: Jam Nice MRN # [id number]  DATE OF VISIT: Dec 15, 2021 YOB: 1945    REFERRING PROVIDER: Referred Self, MD  No address on file    CANCER TYPE: Prostate cancer  STAGE: M0 Stage II with biochemical PSA relapse post radiation therapy with concomitant hormones                                                    TREATMENT SUMMARY:  - Prostate biopsy in 2008 for rising PSA - 4.8; revealed [place] 3+3 disease  -  He received two injections of three-month Zoladex Depot (10/3/08 and 1/5/09) in prep for concurrent radiation therapy but opted for surveillance  - Rpt Prostate biopsy for rising PSA done on 10/13/10 revealed again [place] 3+3 disease  - He had repeat prostate biopsy for rising PSA on 8/26/14 which now revealed [place] 3+4 disease  - He was treated with ADT - Zoladex Depot and recieved 3 doses (10.8 mg) on October 8, 2014, January 9, 2015, and April 2, 2015, respectively.  - He underwent definitive radiation therapy from June 9, 2015 --- August 3, 2015 and received 7800 cGy in 39 fractions.  - He is being followed with PSA every 6 months since then     CURRENT INTERVENTIONS:  Observation    SUBJECTIVE   Jam Nice is being followed for prostate cancer with biochemical relapse.     He has no new complains. He continues to do well. He has no new concerns at this visit.       PAST MEDICAL HISTORY     Past Medical History:   Diagnosis Date     Basal cell carcinoma of skin      Hearing problem Aug 2015     History of radiation therapy 2015    Prostate     Hyperlipidemia     statin intolerant     Prostate cancer (H) 2012    S/P radiation rx     Reduced vision      Tinnitus     Ongoing         CURRENT OUTPATIENT MEDICATIONS     Current Outpatient Medications   Medication Sig     PSYLLIUM HUSK PO      tamsulosin (FLOMAX) 0.4 MG capsule TAKE ONE CAPSULE BY MOUTH TWICE A DAY     tamsulosin (FLOMAX) 0.4 MG  capsule Take 2 capsules (0.8 mg) by mouth daily     triamcinolone (KENALOG) 0.1 % external cream Apply topically 2 times daily     No current facility-administered medications for this visit.        ALLERGIES      Allergies   Allergen Reactions     Cats Cough and Itching     Simvastatin      Aches, low energy, not feeling well        REVIEW OF SYSTEMS   As above in the HPI, o/w complete 12-point ROS was negative.     PHYSICAL EXAM   There were no vitals taken for this visit.  GEN: NAD  HEENT: PERRL, EOMI, no icterus, injection or pallor. Oropharynx is clear.  LYMPHATICs: no cervical or supraclavicular lymphadenopathy; no other abn lymphadenopathy  PULMONARY: clear with good air entry bilaterally  CARDIOVASCULAR: regular, no murmurs, rubs, or gallops  GASTROINTESTINAL: soft, non-tender, non-distended, normal bowel sounds, no hepatosplenomegaly by percussion or palpation  MUSCULOSKELTAL: warm, well perfused, no edema  NEURO: awake, alert and oriented to time place and person, cranial nerves intact - II - XII, no focal neurologic deficits  SKIN: no rashes     LABORATORY AND IMAGING STUDIES     Recent Labs   Lab Test 12/06/21  0816 06/28/21  0833 12/28/20  1049 07/08/20  0855 12/09/19  0704    139 140 140 141   POTASSIUM 4.2 4.5 4.1 5.2 4.5   CHLORIDE 107 110* 110* 109 110*   CO2 28 26 27 29 29   ANIONGAP 4 3 3 2* 2*   BUN 31* 28 22 24 23   CR 0.79 0.90 0.83 0.85 0.93   GLC 70 81 101* 81 101*   LUZ 8.4* 8.7 8.9 8.8 9.1     No results for input(s): MAG, PHOS in the last 20132 hours.  Recent Labs   Lab Test 12/06/21  0816 06/28/21  0833 12/28/20  1049 07/08/20  0855 12/09/19  0704 12/18/18  1000   WBC 4.3 4.9 5.9 6.2 4.9 5.9   HGB 13.5 13.6 13.7 14.4 14.2 13.9    207 238 219 209 192    101* 101* 100 100 96   NEUTROPHIL 63 63.8 66.7 67.2  --  71.9     Recent Labs   Lab Test 12/06/21  0816 06/28/21  0833 12/28/20  1049   BILITOTAL 0.5 0.4 0.6   ALKPHOS 60 61 72   ALT 18 22 24   AST 12 15 16   ALBUMIN 3.6  3.7 3.9         Results for orders placed or performed in visit on 07/10/20   XR Hand Left G/E 3 Views    Narrative    EXAM: XR HAND LT G/E 3 VW  7/10/2020 8:27 AM      HISTORY: Ganglion cyst    COMPARISON: None    FINDINGS: 3 views of left hand.    No acute osseous abnormality. No erosion. Mild triscaphe and first  carpometacarpal joint degenerative change. Degenerative change of the  first and second metacarpophalangeal joints. Cystic changes in the  proximal lunate. Distal radioulnar joint degenerative change.    Rounded soft tissue prominence ulnar to the distal shaft of the third  proximal phalanx measuring up to 11 mm.       Impression    IMPRESSION:   1. Rounded soft tissue prominence ulnar to the distal shaft of the  third proximal phalanx consistent with provided history of ganglion  cyst.    2. Polyarticular degenerative change.         I have personally reviewed the examination and initial interpretation  and I agree with the findings.    MABEL (Rut BARNES MD     Recent Labs   Lab Test 12/06/21  0816 06/28/21  0833 12/28/20  1049 07/08/20  0855 12/09/19  0704   PSA 0.33 0.36 0.38 0.38 0.40   TESTOSTTOTAL 165 172* 229* 172* 202*      ASSESSMENT AND PLAN   1. Hormone sensitive prostate cancer - [place] 3+4     s/p definitive radiation therapy 7800 cGy in 39 fractions from 6/9-8/3/15    ADT with 3 doses from Oct 2014 through April 2015  2. ECOG PS 0  3. No medical comorbidities  4. Followed every 6 months for surveillance      Bill is doing well. He has no new complains. He had labs done locally. He had labs drawn last week. He has looked at the results.    I have reviewed all of the labs done prior to this clinic visit.  Labs are all completely normal including electrolytes, renal function, hepatic panel, complete blood count and differential except for borderline hypocalcemia.    His PSA has come back a little lower at 0.33 ng/ml which is essentially the same number as last time in June when it  was 0.36 ng/ml.  We will continue to monitor as it remained stable.    I will continue to see him in 6 months with PSA and testosterone. I explained him that irrespective of PSA value (unless it is dramatically different), we will not be acting on it at this time. We will continue to follow this. His PSA values for last couple of years have been stable.     I have refilled his tamsulosin.     Video-Visit Details    Type of service:  Video Visit  Originating Location (pt. Location): Home  Distant Location (provider location): [facility] CANCER [place]   Platform used for Video Visit: [employer]    15 minutes spent on the date of the encounter doing chart review, history and exam, documentation and further activities as noted above      Franky Arroyo    Hematologist and Medical Oncologist  [facility]

## 2021-12-16 RX ORDER — TAMSULOSIN HYDROCHLORIDE 0.4 MG/1
0.8 CAPSULE ORAL DAILY
Qty: 180 CAPSULE | Refills: 3 | Status: SHIPPED | OUTPATIENT
Start: 2021-12-16 | End: 2022-12-20

## 2022-05-13 ENCOUNTER — MYC MEDICAL ADVICE (OUTPATIENT)
Dept: PODIATRY | Facility: CLINIC | Age: 77
End: 2022-05-13
Payer: MEDICARE

## 2022-06-06 ENCOUNTER — LAB (OUTPATIENT)
Dept: LAB | Facility: CLINIC | Age: 77
End: 2022-06-06
Payer: MEDICARE

## 2022-06-06 DIAGNOSIS — C61 MALIGNANT NEOPLASM OF PROSTATE (H): ICD-10-CM

## 2022-06-06 LAB
ALBUMIN SERPL-MCNC: 3.8 G/DL (ref 3.4–5)
ALP SERPL-CCNC: 62 U/L (ref 40–150)
ALT SERPL W P-5'-P-CCNC: 19 U/L (ref 0–70)
ANION GAP SERPL CALCULATED.3IONS-SCNC: 4 MMOL/L (ref 3–14)
AST SERPL W P-5'-P-CCNC: 10 U/L (ref 0–45)
BASOPHILS # BLD AUTO: 0 10E3/UL (ref 0–0.2)
BASOPHILS NFR BLD AUTO: 1 %
BILIRUB SERPL-MCNC: 0.6 MG/DL (ref 0.2–1.3)
BUN SERPL-MCNC: 26 MG/DL (ref 7–30)
CALCIUM SERPL-MCNC: 8.8 MG/DL (ref 8.5–10.1)
CHLORIDE BLD-SCNC: 110 MMOL/L (ref 94–109)
CO2 SERPL-SCNC: 26 MMOL/L (ref 20–32)
CREAT SERPL-MCNC: 0.88 MG/DL (ref 0.52–1.25)
EOSINOPHIL # BLD AUTO: 0.1 10E3/UL (ref 0–0.7)
EOSINOPHIL NFR BLD AUTO: 2 %
ERYTHROCYTE [DISTWIDTH] IN BLOOD BY AUTOMATED COUNT: 13.1 % (ref 10–15)
GFR SERPL CREATININE-BSD FRML MDRD: 89 ML/MIN/1.73M2
GLUCOSE BLD-MCNC: 84 MG/DL (ref 70–99)
HCT VFR BLD AUTO: 42.9 % (ref 35–53)
HGB BLD-MCNC: 13.5 G/DL (ref 11.7–17.7)
LYMPHOCYTES # BLD AUTO: 1 10E3/UL (ref 0.8–5.3)
LYMPHOCYTES NFR BLD AUTO: 20 %
MCH RBC QN AUTO: 31.8 PG (ref 26.5–33)
MCHC RBC AUTO-ENTMCNC: 31.5 G/DL (ref 31.5–36.5)
MCV RBC AUTO: 101 FL (ref 78–100)
MONOCYTES # BLD AUTO: 0.5 10E3/UL (ref 0–1.3)
MONOCYTES NFR BLD AUTO: 9 %
NEUTROPHILS # BLD AUTO: 3.6 10E3/UL (ref 1.6–8.3)
NEUTROPHILS NFR BLD AUTO: 69 %
PLATELET # BLD AUTO: 201 10E3/UL (ref 150–450)
POTASSIUM BLD-SCNC: 4.8 MMOL/L (ref 3.4–5.3)
PROT SERPL-MCNC: 6.9 G/DL (ref 6.8–8.8)
PSA SERPL-MCNC: 0.34 UG/L (ref 0–4)
RBC # BLD AUTO: 4.24 10E6/UL (ref 3.8–5.9)
SODIUM SERPL-SCNC: 140 MMOL/L (ref 133–144)
WBC # BLD AUTO: 5.2 10E3/UL (ref 4–11)

## 2022-06-06 PROCEDURE — 85025 COMPLETE CBC W/AUTO DIFF WBC: CPT

## 2022-06-06 PROCEDURE — 84153 ASSAY OF PSA TOTAL: CPT

## 2022-06-06 PROCEDURE — 84403 ASSAY OF TOTAL TESTOSTERONE: CPT

## 2022-06-06 PROCEDURE — 36415 COLL VENOUS BLD VENIPUNCTURE: CPT

## 2022-06-06 PROCEDURE — 80053 COMPREHEN METABOLIC PANEL: CPT

## 2022-06-08 LAB — TESTOST SERPL-MCNC: 141 NG/DL (ref 8–950)

## 2022-06-15 ENCOUNTER — VIRTUAL VISIT (OUTPATIENT)
Dept: ONCOLOGY | Facility: CLINIC | Age: 77
End: 2022-06-15
Attending: INTERNAL MEDICINE
Payer: MEDICARE

## 2022-06-15 DIAGNOSIS — C61 MALIGNANT NEOPLASM OF PROSTATE (H): Primary | ICD-10-CM

## 2022-06-15 PROCEDURE — 99213 OFFICE O/P EST LOW 20 MIN: CPT | Mod: 95 | Performed by: INTERNAL MEDICINE

## 2022-06-15 PROCEDURE — G0463 HOSPITAL OUTPT CLINIC VISIT: HCPCS | Mod: PN,RTG | Performed by: INTERNAL MEDICINE

## 2022-06-15 NOTE — PROGRESS NOTES
Alvin is a 77 year old who is being evaluated via a billable video visit.      How would you like to obtain your AVS? MyChart  If the video visit is dropped, the invitation should be resent by: Text to cell phone: [phone]   Will anyone else be joining your video visit? Jesusita Boggs, GLODIE/CMA

## 2022-06-15 NOTE — LETTER
6/15/2022         RE: Jam Nice  [address]  [facility] [address]        Dear Colleague,    Thank you for referring your patient, Jam Nice, to the [facility] CANCER [facility]INIC. Please see a copy of my visit note below.        [facility]  HEMATOLOGY AND ONCOLOGY    FOLLOW-UP VISIT NOTE    PATIENT NAME: Jam Nice MRN # [id number]  DATE OF VISIT: Derrick 15, 2022 YOB: 1945    REFERRING PROVIDER: Referred Self, MD  No address on file    CANCER TYPE: Prostate cancer  STAGE: M0 Stage II with biochemical PSA relapse post radiation therapy with concomitant hormones                                                    TREATMENT SUMMARY:  - Prostate biopsy in 2008 for rising PSA - 4.8; revealed [place] 3+3 disease  -  He received two injections of three-month Zoladex Depot (10/3/08 and 1/5/09) in prep for concurrent radiation therapy but opted for surveillance  - Rpt Prostate biopsy for rising PSA done on 10/13/10 revealed again [place] 3+3 disease  - He had repeat prostate biopsy for rising PSA on 8/26/14 which now revealed [place] 3+4 disease  - He was treated with ADT - Zoladex Depot and recieved 3 doses (10.8 mg) on October 8, 2014, January 9, 2015, and April 2, 2015, respectively.  - He underwent definitive radiation therapy from June 9, 2015 --- August 3, 2015 and received 7800 cGy in 39 fractions.  - He is being followed with PSA every 6 months since then     CURRENT INTERVENTIONS:  Observation    SUBJECTIVE   Jam Nice is being followed for prostate cancer with biochemical relapse.     He has no new complains. He continues to do well. He has no new concerns at this visit.       PAST MEDICAL HISTORY     Past Medical History:   Diagnosis Date     Basal cell carcinoma of skin      Hearing problem Aug 2015     History of radiation therapy 2015    Prostate     Hyperlipidemia     statin intolerant     Prostate cancer (H) 2012    S/P radiation  rx     Reduced vision      Tinnitus     Ongoing         CURRENT OUTPATIENT MEDICATIONS     Current Outpatient Medications   Medication Sig     PSYLLIUM HUSK PO      tamsulosin (FLOMAX) 0.4 MG capsule Take 2 capsules (0.8 mg) by mouth daily     triamcinolone (KENALOG) 0.1 % external cream Apply topically 2 times daily     No current facility-administered medications for this visit.        ALLERGIES      Allergies   Allergen Reactions     Cats Cough and Itching     Simvastatin      Aches, low energy, not feeling well        REVIEW OF SYSTEMS   As above in the HPI, o/w complete 12-point ROS was negative.     PHYSICAL EXAM   There were no vitals taken for this visit.  GEN: NAD  HEENT: PERRL, EOMI, no icterus, injection or pallor. Oropharynx is clear.  LYMPHATICs: no cervical or supraclavicular lymphadenopathy; no other abn lymphadenopathy  PULMONARY: clear with good air entry bilaterally  CARDIOVASCULAR: regular, no murmurs, rubs, or gallops  GASTROINTESTINAL: soft, non-tender, non-distended, normal bowel sounds, no hepatosplenomegaly by percussion or palpation  MUSCULOSKELTAL: warm, well perfused, no edema  NEURO: awake, alert and oriented to time place and person, cranial nerves intact - II - XII, no focal neurologic deficits  SKIN: no rashes     LABORATORY AND IMAGING STUDIES     Recent Labs   Lab Test 06/06/22  0832 12/06/21  0816 06/28/21  0833 12/28/20  1049 07/08/20  0855    139 139 140 140   POTASSIUM 4.8 4.2 4.5 4.1 5.2   CHLORIDE 110* 107 110* 110* 109   CO2 26 28 26 27 29   ANIONGAP 4 4 3 3 2*   BUN 26 31* 28 22 24   CR 0.88 0.79 0.90 0.83 0.85   GLC 84 70 81 101* 81   LUZ 8.8 8.4* 8.7 8.9 8.8     No results for input(s): MAG, PHOS in the last 68484 hours.  Recent Labs   Lab Test 06/06/22  0832 12/06/21  0816 06/28/21  0833 12/28/20  1049 07/08/20  0855   WBC 5.2 4.3 4.9 5.9 6.2   HGB 13.5 13.5 13.6 13.7 14.4    194 207 238 219   * 100 101* 101* 100   NEUTROPHIL 69 63 63.8 66.7 67.2      Recent Labs   Lab Test 06/06/22  0832 12/06/21  0816 06/28/21  0833   BILITOTAL 0.6 0.5 0.4   ALKPHOS 62 60 61   ALT 19 18 22   AST 10 12 15   ALBUMIN 3.8 3.6 3.7     Recent Labs   Lab Test 06/06/22  0832 12/06/21  0816 06/28/21  0833 12/28/20  1049 07/08/20  0855   PSA 0.34 0.33 0.36 0.38 0.38   TESTOSTTOTAL 141 165 172* 229* 172*      ASSESSMENT AND PLAN   1. Hormone sensitive prostate cancer - [place] 3+4     s/p definitive radiation therapy 7800 cGy in 39 fractions from 6/9-8/3/15    ADT with 3 doses from Oct 2014 through April 2015  2. ECOG PS 0  3. No medical comorbidities  4. Followed every 6 months for surveillance      Bill is doing well. He has no new complains. He had labs done locally. He had labs drawn last week. He has looked at the results.    I have reviewed all of the labs done prior to this clinic visit.  Labs are all completely normal including electrolytes, renal function, hepatic panel, complete blood count and differential except for borderline macrocytosis.    His PSA has come back a stable/unchanged at 0.34 ng/ml which is essentially the same number as last time.  We will continue to monitor as it remained stable.    I will continue to see him in 6 months with PSA and testosterone. I explained him that irrespective of PSA value (unless it is dramatically different), we will not be acting on it at this time. We will continue to follow this. His PSA values for last couple of years have been stable.     I had refilled his tamsulosin last visit.        Franky Arroyo    Hematologist and Medical Oncologist  [facility]

## 2022-06-23 ENCOUNTER — MYC MEDICAL ADVICE (OUTPATIENT)
Dept: INTERNAL MEDICINE | Facility: CLINIC | Age: 77
End: 2022-06-23

## 2022-07-11 ASSESSMENT — ENCOUNTER SYMPTOMS
TREMORS: 0
NECK MASS: 0
TINGLING: 1
SPEECH CHANGE: 0
SKIN CHANGES: 0
BLOOD IN STOOL: 0
SMELL DISTURBANCE: 0
SEIZURES: 0
TASTE DISTURBANCE: 0
RECTAL PAIN: 0
NUMBNESS: 1
BLOATING: 0
SORE THROAT: 0
FLANK PAIN: 0
DISTURBANCES IN COORDINATION: 0
NAIL CHANGES: 0
VOMITING: 0
HEMATURIA: 0
NAUSEA: 0
MEMORY LOSS: 0
BOWEL INCONTINENCE: 0
HEARTBURN: 0
LOSS OF CONSCIOUSNESS: 0
WEAKNESS: 0
POOR WOUND HEALING: 0
PARALYSIS: 0
JAUNDICE: 0
DIFFICULTY URINATING: 1
TROUBLE SWALLOWING: 0
SINUS CONGESTION: 0
DYSURIA: 0
CONSTIPATION: 0
SINUS PAIN: 0
HOARSE VOICE: 0
HEADACHES: 0
ABDOMINAL PAIN: 0
DIARRHEA: 0
DIZZINESS: 0

## 2022-07-11 ASSESSMENT — ACTIVITIES OF DAILY LIVING (ADL)
IN_THE_PAST_7_DAYS,_DID_YOU_NEED_HELP_FROM_OTHERS_TO_PERFORM_EVERYDAY_ACTIVITIES_SUCH_AS_EATING,_GETTING_DRESSED,_GROOMING,_BATHING,_WALKING,_OR_USING_THE_TOILET: N
IN_THE_PAST_7_DAYS,_DID_YOU_NEED_HELP_FROM_OTHERS_TO_TAKE_CARE_OF_THINGS_SUCH_AS_LAUNDRY_AND_HOUSEKEEPING,_BANKING,_SHOPPING,_USING_THE_TELEPHONE,_FOOD_PREPARATION,_TRANSPORTATION,_OR_TAKING_YOUR_OWN_MEDICATIONS?: N

## 2022-07-18 ENCOUNTER — MYC MEDICAL ADVICE (OUTPATIENT)
Dept: INTERNAL MEDICINE | Facility: CLINIC | Age: 77
End: 2022-07-18

## 2022-07-18 ENCOUNTER — OFFICE VISIT (OUTPATIENT)
Dept: INTERNAL MEDICINE | Facility: CLINIC | Age: 77
End: 2022-07-18
Payer: MEDICARE

## 2022-07-18 ENCOUNTER — LAB (OUTPATIENT)
Dept: LAB | Facility: CLINIC | Age: 77
End: 2022-07-18
Payer: MEDICARE

## 2022-07-18 VITALS
HEART RATE: 58 BPM | RESPIRATION RATE: 16 BRPM | DIASTOLIC BLOOD PRESSURE: 66 MMHG | SYSTOLIC BLOOD PRESSURE: 117 MMHG | OXYGEN SATURATION: 99 % | HEIGHT: 72 IN | BODY MASS INDEX: 23.93 KG/M2 | WEIGHT: 176.7 LBS

## 2022-07-18 DIAGNOSIS — C61 MALIGNANT NEOPLASM OF PROSTATE (H): ICD-10-CM

## 2022-07-18 DIAGNOSIS — G62.9 PERIPHERAL POLYNEUROPATHY: Primary | ICD-10-CM

## 2022-07-18 DIAGNOSIS — Z12.11 SPECIAL SCREENING FOR MALIGNANT NEOPLASMS, COLON: ICD-10-CM

## 2022-07-18 DIAGNOSIS — E78.5 HYPERLIPIDEMIA, UNSPECIFIED HYPERLIPIDEMIA TYPE: ICD-10-CM

## 2022-07-18 DIAGNOSIS — G62.9 PERIPHERAL POLYNEUROPATHY: ICD-10-CM

## 2022-07-18 LAB
ALBUMIN SERPL-MCNC: 3.9 G/DL (ref 3.4–5)
ALP SERPL-CCNC: 64 U/L (ref 40–150)
ALT SERPL W P-5'-P-CCNC: 21 U/L (ref 0–70)
ANION GAP SERPL CALCULATED.3IONS-SCNC: 5 MMOL/L (ref 3–14)
AST SERPL W P-5'-P-CCNC: 17 U/L (ref 0–45)
BASOPHILS # BLD AUTO: 0 10E3/UL (ref 0–0.2)
BASOPHILS NFR BLD AUTO: 0 %
BILIRUB SERPL-MCNC: 0.5 MG/DL (ref 0.2–1.3)
BUN SERPL-MCNC: 25 MG/DL (ref 7–30)
CALCIUM SERPL-MCNC: 9.5 MG/DL (ref 8.5–10.1)
CHLORIDE BLD-SCNC: 110 MMOL/L (ref 94–109)
CO2 SERPL-SCNC: 28 MMOL/L (ref 20–32)
CREAT SERPL-MCNC: 0.84 MG/DL (ref 0.52–1.25)
EOSINOPHIL # BLD AUTO: 0.2 10E3/UL (ref 0–0.7)
EOSINOPHIL NFR BLD AUTO: 3 %
ERYTHROCYTE [DISTWIDTH] IN BLOOD BY AUTOMATED COUNT: 12.8 % (ref 10–15)
GFR SERPL CREATININE-BSD FRML MDRD: 90 ML/MIN/1.73M2
GLUCOSE BLD-MCNC: 100 MG/DL (ref 70–99)
HCT VFR BLD AUTO: 43.4 % (ref 35–53)
HGB BLD-MCNC: 13.6 G/DL (ref 11.7–17.7)
IMM GRANULOCYTES # BLD: 0 10E3/UL
IMM GRANULOCYTES NFR BLD: 0 %
LYMPHOCYTES # BLD AUTO: 1.2 10E3/UL (ref 0.8–5.3)
LYMPHOCYTES NFR BLD AUTO: 18 %
MCH RBC QN AUTO: 31.4 PG (ref 26.5–33)
MCHC RBC AUTO-ENTMCNC: 31.3 G/DL (ref 31.5–36.5)
MCV RBC AUTO: 100 FL (ref 78–100)
MONOCYTES # BLD AUTO: 0.6 10E3/UL (ref 0–1.3)
MONOCYTES NFR BLD AUTO: 9 %
NEUTROPHILS # BLD AUTO: 4.8 10E3/UL (ref 1.6–8.3)
NEUTROPHILS NFR BLD AUTO: 70 %
NRBC # BLD AUTO: 0 10E3/UL
NRBC BLD AUTO-RTO: 0 /100
PLATELET # BLD AUTO: 206 10E3/UL (ref 150–450)
POTASSIUM BLD-SCNC: 5 MMOL/L (ref 3.4–5.3)
PROT SERPL-MCNC: 7.1 G/DL (ref 6.8–8.8)
PSA SERPL-MCNC: 0.36 UG/L (ref 0–4)
RBC # BLD AUTO: 4.33 10E6/UL (ref 3.8–5.9)
SODIUM SERPL-SCNC: 143 MMOL/L (ref 133–144)
TOTAL PROTEIN SERUM FOR ELP: 6.9 G/DL (ref 6.4–8.3)
VIT B12 SERPL-MCNC: 938 PG/ML (ref 193–986)
WBC # BLD AUTO: 6.8 10E3/UL (ref 4–11)

## 2022-07-18 PROCEDURE — 36415 COLL VENOUS BLD VENIPUNCTURE: CPT | Performed by: PATHOLOGY

## 2022-07-18 PROCEDURE — 83921 ORGANIC ACID SINGLE QUANT: CPT | Performed by: INTERNAL MEDICINE

## 2022-07-18 PROCEDURE — 83785 ASSAY OF MANGANESE: CPT | Mod: 90 | Performed by: PATHOLOGY

## 2022-07-18 PROCEDURE — 84165 PROTEIN E-PHORESIS SERUM: CPT | Mod: 26 | Performed by: STUDENT IN AN ORGANIZED HEALTH CARE EDUCATION/TRAINING PROGRAM

## 2022-07-18 PROCEDURE — 84165 PROTEIN E-PHORESIS SERUM: CPT | Mod: TC | Performed by: STUDENT IN AN ORGANIZED HEALTH CARE EDUCATION/TRAINING PROGRAM

## 2022-07-18 PROCEDURE — 84403 ASSAY OF TOTAL TESTOSTERONE: CPT | Performed by: INTERNAL MEDICINE

## 2022-07-18 PROCEDURE — 82607 VITAMIN B-12: CPT | Performed by: PATHOLOGY

## 2022-07-18 PROCEDURE — 99000 SPECIMEN HANDLING OFFICE-LAB: CPT | Performed by: PATHOLOGY

## 2022-07-18 PROCEDURE — 84155 ASSAY OF PROTEIN SERUM: CPT | Performed by: INTERNAL MEDICINE

## 2022-07-18 PROCEDURE — 99214 OFFICE O/P EST MOD 30 MIN: CPT | Performed by: INTERNAL MEDICINE

## 2022-07-18 RX ORDER — MULTIPLE VITAMINS W/ MINERALS TAB 9MG-400MCG
1 TAB ORAL DAILY
COMMUNITY
Start: 2022-07-18

## 2022-07-18 ASSESSMENT — PAIN SCALES - GENERAL: PAINLEVEL: NO PAIN (0)

## 2022-07-18 NOTE — PROGRESS NOTES
HPI  77-year-old presents today for physical examination.  He has been doing well walking 5 to 6 miles a day eating healthy by his report.  He has noted a sensation of numbness and tingling predominantly in the bottoms of his feet when he is walking and exercising.  He also has noted a sharp pain in the shins bilaterally at night when he lays on his right side.  Otherwise it is not present and is not present while he is walking or exercising.  Not associated with any tenderness or redness or inflammation.  He denies any injury or trauma.  Neither of these symptoms are problematic enough to interfere with his regular exercise activity or activities of daily living.  He reports he sleeping well uses no alcohol.  Past Medical History:   Diagnosis Date     Basal cell carcinoma of skin      Hearing problem Aug 2015     History of radiation therapy     Prostate     Hyperlipidemia     statin intolerant     Prostate cancer (H)     S/P radiation rx     Reduced vision      Tinnitus     Ongoing     Past Surgical History:   Procedure Laterality Date     .menisectomy Left 1967     HERNIA REPAIR       ORTHOPEDIC SURGERY  1967     Family History   Problem Relation Age of Onset     Alcoholism Mother          90     Cerebrovascular Disease Mother         Possible     Substance Abuse Mother      Migraines Mother      Stomach Problem Mother      Coronary Artery Disease Father          51 MI     Heart Disease Father          at age 59     Substance Abuse Father      Alcoholism Sister          55     Cancer Sister      Substance Abuse Sister      Substance Abuse Sister      Dementia Sister         Recently diagnosed     Answers for HPI/ROS submitted by the patient on 2022  General Symptoms: No  Skin Symptoms: Yes  HENT Symptoms: Yes  EYE SYMPTOMS: No  HEART SYMPTOMS: No  LUNG SYMPTOMS: No  INTESTINAL SYMPTOMS: Yes  URINARY SYMPTOMS: Yes  GYNECOLOGIC SYMPTOMS: No  REPRODUCTIVE SYMPTOMS: No  BREAST  "SYMPTOMS: No  SKELETAL SYMPTOMS: No  BLOOD SYMPTOMS: No  NERVOUS SYSTEM SYMPTOMS: Yes  MENTAL HEALTH SYMPTOMS: No  Ear pain: No  Ear discharge: No  Hearing loss: No  Tinnitus: Yes  Nosebleeds: No  Congestion: No  Sinus pain: No  Trouble swallowing: No   Voice hoarseness: No  Mouth sores: No  Sore throat: No  Tooth pain: No  Gum tenderness: No  Bleeding gums: No  Change in taste: No  Change in sense of smell: No  Dry mouth: No  Hearing aid used: No  Neck lump: No  Changes in hair: No  Changes in moles/birth marks: No  Itching: Yes  Rashes: Yes  Changes in nails: No  Acne: No  Hair in places you don't want it: No  Change in facial hair: No  Warts: No  Non-healing sores: No  Scarring: No  Flaking of skin: Yes  Color changes of hands/feet in cold : No  Sun sensitivity: No  Skin thickening: No  Heart burn or indigestion: No  Nausea: No  Vomiting: No  Abdominal pain: No  Bloating: No  Constipation: No  Diarrhea: No  Blood in stool: No  Black stools: No  Rectal or Anal pain: No  Fecal incontinence: No  Yellowing of skin or eyes: No  Vomit with blood: No  Change in stools: No  Trouble holding urine or incontinence: No  Pain or burning: No  Trouble starting or stopping: Yes  Increased frequency of urination: No  Blood in urine: No  Decreased frequency of urination: No  Frequent nighttime urination: Yes  Flank pain: No  Difficulty emptying bladder: Yes  Trouble with coordination: No  Dizziness or trouble with balance: No  Fainting or black-out spells: No  Memory loss: No  Headache: No  Seizures: No  Speech problems: No  Tingling: Yes  Tremor: No  Weakness: No  Difficulty walking: No  Paralysis: No  Numbness: Yes        Exam:  /66 (BP Location: Right arm, Patient Position: Sitting, Cuff Size: Adult Regular)   Pulse 58   Resp 16   Ht 1.822 m (5' 11.73\")   Wt 80.2 kg (176 lb 11.2 oz)   SpO2 99%   BMI 24.14 kg/m    176 lbs 11.2 oz  Physical Exam   The patient is alert, oriented with a clear sensorium.   Skin shows no " lesions or rashes and good turgor.   Head is normocephalic and atraumatic.   Eyes show PERRLA   Ears show cerumen bilaterally.   Mouth shows clear oral mucosa.   Neck shows no nodes, thyromegaly or bruits.   Back is non tender.  Lungs are clear to percussion and auscultation.   Heart shows normal S1 and S2 without murmur or gallop.   Abdomen is soft and nontender without masses or organomegaly.   Genitalia show atrophic testes. No evidence of inguinal hernia.  Extremities show venous varicosities, no edema and no evidence of active synovitis.  No tenderness  Neurologic examination shows cranial nerves II-XII intact. Motor shows 5/5 strength. Reflexes are symmetric. Cerebellar testing shows normal tandem gait.  Romberg negative.  Sensory exam shows diffuse decreased sensation to monofilament and vibration distally in the toes and distal foot sensation intact along the shins  Labs reviewed:  Results for orders placed or performed in visit on 07/18/22   Comprehensive metabolic panel     Status: Abnormal   Result Value Ref Range    Sodium 143 133 - 144 mmol/L    Potassium 5.0 3.4 - 5.3 mmol/L    Chloride 110 (H) 94 - 109 mmol/L    Carbon Dioxide (CO2) 28 20 - 32 mmol/L    Anion Gap 5 3 - 14 mmol/L    Urea Nitrogen 25 7 - 30 mg/dL    Creatinine 0.84 0.52 - 1.25 mg/dL    Calcium 9.5 8.5 - 10.1 mg/dL    Glucose 100 (H) 70 - 99 mg/dL    Alkaline Phosphatase 64 40 - 150 U/L    AST 17 0 - 45 U/L    ALT 21 0 - 70 U/L    Protein Total 7.1 6.8 - 8.8 g/dL    Albumin 3.9 3.4 - 5.0 g/dL    Bilirubin Total 0.5 0.2 - 1.3 mg/dL    GFR Estimate 90 >60 mL/min/1.73m2    Narrative    The sex of this patient cannot be reliably determined based on discrepancies in demographics (legal sex, sex assigned at birth, gender identity).  Both male and female reference ranges are provided where applicable.  Careful evaluation of the patient s results as compared to the gender specific reference intervals is required in this setting.    PSA tumor  marker     Status: Normal   Result Value Ref Range    PSA Tumor Marker 0.36 0.00 - 4.00 ug/L    Narrative    The sex of this patient cannot be reliably determined based on discrepancies in demographics (legal sex, sex assigned at birth, gender identity).  Both male and female reference ranges are provided where applicable.  Careful evaluation of the patient s results as compared to the gender specific reference intervals is required in this setting.   Assay Method:  Chemiluminescence using Siemens   Vista analyzer.   Testosterone total     Status: Normal   Result Value Ref Range    Testosterone Total 203 8 - 950 ng/dL    Narrative    The sex of this patient cannot be reliably determined based on discrepancies in demographics (legal sex, sex assigned at birth, gender identity).  Both male and female reference ranges are provided where applicable.  Careful evaluation of the patient s results as compared to the gender specific reference intervals is required in this setting.    Vitamin B12     Status: Normal   Result Value Ref Range    Vitamin B12 938 193 - 986 pg/mL   Methylmalonic Acid     Status: Normal   Result Value Ref Range    Methylmalonic Acid 0.19 0.00 - 0.40 umol/L    Narrative    This test was developed and its performance characteristics determined by the [facility],  Special [facility] Laboratory. It has not been cleared or approved by the FDA. The laboratory is regulated under CLIA as qualified to perform high-complexity testing. This test is used for clinical purposes. It should not be regarded as investigational or for research.   Manganese Whole Blood     Status: None   Result Value Ref Range    Manganese Whole Bld 4.5 4.2 - 16.5 ug/L   CBC with platelets and differential     Status: Abnormal   Result Value Ref Range    WBC Count 6.8 4.0 - 11.0 10e3/uL    RBC Count 4.33 3.80 - 5.90 10e6/uL    Hemoglobin 13.6 11.7 - 17.7 g/dL    Hematocrit 43.4 35.0 - 53.0 %     78 -  100 fL    MCH 31.4 26.5 - 33.0 pg    MCHC 31.3 (L) 31.5 - 36.5 g/dL    RDW 12.8 10.0 - 15.0 %    Platelet Count 206 150 - 450 10e3/uL    % Neutrophils 70 %    % Lymphocytes 18 %    % Monocytes 9 %    % Eosinophils 3 %    % Basophils 0 %    % Immature Granulocytes 0 %    NRBCs per 100 WBC 0 <1 /100    Absolute Neutrophils 4.8 1.6 - 8.3 10e3/uL    Absolute Lymphocytes 1.2 0.8 - 5.3 10e3/uL    Absolute Monocytes 0.6 0.0 - 1.3 10e3/uL    Absolute Eosinophils 0.2 0.0 - 0.7 10e3/uL    Absolute Basophils 0.0 0.0 - 0.2 10e3/uL    Absolute Immature Granulocytes 0.0 <=0.4 10e3/uL    Absolute NRBCs 0.0 10e3/uL    Narrative    The sex of this patient cannot be reliably determined based on discrepancies in demographics (legal sex, sex assigned at birth, gender identity).  Both male and female reference ranges are provided where applicable.  Careful evaluation of the patient s results as compared to the gender specific reference intervals is required in this setting.    Total Protein, Serum for ELP     Status: Normal   Result Value Ref Range    Total Protein Serum for ELP 6.9 6.4 - 8.3 g/dL   Protein Electrophoresis, Serum     Status: None   Result Value Ref Range    Albumin 4.3 3.7 - 5.1 g/dL    Alpha 1 0.3 0.2 - 0.4 g/dL    Alpha 2 0.6 0.5 - 0.9 g/dL    Beta Globulin 0.7 0.6 - 1.0 g/dL    Gamma Globulin 1.0 0.7 - 1.6 g/dL    Monoclonal Peak 0.0 <=0.0 g/dL    ELP Interpretation       Essentially normal electrophoretic pattern. No obvious monoclonal proteins seen. Pathologic significance requires clinical correlation. Ingris Jernigan M.D., Ph.D.   CBC with Platelets & Differential     Status: Abnormal    Narrative    The following orders were created for panel order CBC with Platelets & Differential.  Procedure                               Abnormality         Status                     ---------                               -----------         ------                     CBC with platelets and d...[[id number]]  Abnormal             Final result                 Please view results for these tests on the individual orders.   Protein electrophoresis     Status: None    Narrative    The following orders were created for panel order Protein electrophoresis.  Procedure                               Abnormality         Status                     ---------                               -----------         ------                     Total Protein, Serum for...[[id number]]  Normal              Final result               Protein Electrophoresis,...[[id number]]                      Final result                 Please view results for these tests on the individual orders.         ASSESSMENT  1 Peripheral neuropathy uncertain etiology probably idiopathic  2 hyperlipidemia statin intolerant doing well  3 history of basal cell skin cancer   4 Psoriasis  5 prostate cancer status post radiation therapy  6 FIT testing annually    Plan  Will assess for amyloid B12 deficiency and manganese deficiency in light of his peripheral neuropathy.  Encouraged him regarding his diet and exercise and will add a multivitamin daily in light of the neuropathy.  We will do annual FIT testing for colon cancer screening and check his fasting lipids in December at the time of that blood draw.    This note was completed using Dragon voice recognition software.      Joey Davis MD  General Internal Medicine  [facility]  [phone]

## 2022-07-18 NOTE — NURSING NOTE
Chief Complaint   Patient presents with     Physical       GENEVIEVE Maria at 9:50 AM on 7/18/2022.

## 2022-07-19 LAB
ALBUMIN SERPL ELPH-MCNC: 4.3 G/DL (ref 3.7–5.1)
ALPHA1 GLOB SERPL ELPH-MCNC: 0.3 G/DL (ref 0.2–0.4)
ALPHA2 GLOB SERPL ELPH-MCNC: 0.6 G/DL (ref 0.5–0.9)
B-GLOBULIN SERPL ELPH-MCNC: 0.7 G/DL (ref 0.6–1)
GAMMA GLOB SERPL ELPH-MCNC: 1 G/DL (ref 0.7–1.6)
M PROTEIN SERPL ELPH-MCNC: 0 G/DL
PROT PATTERN SERPL ELPH-IMP: NORMAL

## 2022-07-20 LAB
MANGANESE BLD-MCNC: 4.5 UG/L
TESTOST SERPL-MCNC: 203 NG/DL (ref 8–950)

## 2022-07-21 LAB — METHYLMALONATE SERPL-SCNC: 0.19 UMOL/L (ref 0–0.4)

## 2022-09-29 ENCOUNTER — MYC MEDICAL ADVICE (OUTPATIENT)
Dept: INTERNAL MEDICINE | Facility: CLINIC | Age: 77
End: 2022-09-29

## 2022-12-07 ENCOUNTER — LAB (OUTPATIENT)
Dept: LAB | Facility: CLINIC | Age: 77
End: 2022-12-07
Payer: MEDICARE

## 2022-12-07 DIAGNOSIS — C61 MALIGNANT NEOPLASM OF PROSTATE (H): ICD-10-CM

## 2022-12-07 DIAGNOSIS — E78.5 HYPERLIPIDEMIA, UNSPECIFIED HYPERLIPIDEMIA TYPE: ICD-10-CM

## 2022-12-07 LAB
ALBUMIN SERPL BCG-MCNC: 4.1 G/DL (ref 3.5–5.2)
ALP SERPL-CCNC: 56 U/L (ref 35–129)
ALT SERPL W P-5'-P-CCNC: 17 U/L (ref 10–50)
ANION GAP SERPL CALCULATED.3IONS-SCNC: 11 MMOL/L (ref 7–15)
AST SERPL W P-5'-P-CCNC: 19 U/L (ref 10–50)
BASOPHILS # BLD AUTO: 0 10E3/UL (ref 0–0.2)
BASOPHILS NFR BLD AUTO: 1 %
BILIRUB SERPL-MCNC: 0.4 MG/DL
BUN SERPL-MCNC: 25 MG/DL (ref 8–23)
CALCIUM SERPL-MCNC: 9 MG/DL (ref 8.8–10.2)
CHLORIDE SERPL-SCNC: 105 MMOL/L (ref 98–107)
CHOLEST SERPL-MCNC: 168 MG/DL
CREAT SERPL-MCNC: 0.88 MG/DL (ref 0.51–1.17)
DEPRECATED HCO3 PLAS-SCNC: 25 MMOL/L (ref 22–29)
EOSINOPHIL # BLD AUTO: 0.1 10E3/UL (ref 0–0.7)
EOSINOPHIL NFR BLD AUTO: 3 %
ERYTHROCYTE [DISTWIDTH] IN BLOOD BY AUTOMATED COUNT: 13.1 % (ref 10–15)
GFR SERPL CREATININE-BSD FRML MDRD: 89 ML/MIN/1.73M2
GLUCOSE SERPL-MCNC: 90 MG/DL (ref 70–99)
HCT VFR BLD AUTO: 40.8 % (ref 35–53)
HDLC SERPL-MCNC: 54 MG/DL
HGB BLD-MCNC: 13.4 G/DL (ref 11.7–17.7)
LDLC SERPL CALC-MCNC: 92 MG/DL
LYMPHOCYTES # BLD AUTO: 1 10E3/UL (ref 0.8–5.3)
LYMPHOCYTES NFR BLD AUTO: 19 %
MCH RBC QN AUTO: 32.8 PG (ref 26.5–33)
MCHC RBC AUTO-ENTMCNC: 32.8 G/DL (ref 31.5–36.5)
MCV RBC AUTO: 100 FL (ref 78–100)
MONOCYTES # BLD AUTO: 0.5 10E3/UL (ref 0–1.3)
MONOCYTES NFR BLD AUTO: 9 %
NEUTROPHILS # BLD AUTO: 3.6 10E3/UL (ref 1.6–8.3)
NEUTROPHILS NFR BLD AUTO: 69 %
NONHDLC SERPL-MCNC: 114 MG/DL
PLATELET # BLD AUTO: 209 10E3/UL (ref 150–450)
POTASSIUM SERPL-SCNC: 4.6 MMOL/L (ref 3.4–5.3)
PROT SERPL-MCNC: 6.6 G/DL (ref 6.4–8.3)
PSA SERPL-MCNC: 0.36 NG/ML (ref 0–6.5)
RBC # BLD AUTO: 4.09 10E6/UL (ref 3.8–5.9)
SODIUM SERPL-SCNC: 141 MMOL/L (ref 136–145)
TRIGL SERPL-MCNC: 110 MG/DL
WBC # BLD AUTO: 5.2 10E3/UL (ref 4–11)

## 2022-12-07 PROCEDURE — 84153 ASSAY OF PSA TOTAL: CPT

## 2022-12-07 PROCEDURE — 84403 ASSAY OF TOTAL TESTOSTERONE: CPT

## 2022-12-07 PROCEDURE — 80053 COMPREHEN METABOLIC PANEL: CPT

## 2022-12-07 PROCEDURE — 36415 COLL VENOUS BLD VENIPUNCTURE: CPT

## 2022-12-07 PROCEDURE — 85025 COMPLETE CBC W/AUTO DIFF WBC: CPT

## 2022-12-07 PROCEDURE — 80061 LIPID PANEL: CPT

## 2022-12-09 LAB — TESTOST SERPL-MCNC: 210 NG/DL (ref 8–950)

## 2022-12-14 ENCOUNTER — VIRTUAL VISIT (OUTPATIENT)
Dept: ONCOLOGY | Facility: CLINIC | Age: 77
End: 2022-12-14
Attending: INTERNAL MEDICINE
Payer: MEDICARE

## 2022-12-14 DIAGNOSIS — C61 MALIGNANT NEOPLASM OF PROSTATE (H): Primary | ICD-10-CM

## 2022-12-14 PROCEDURE — 99213 OFFICE O/P EST LOW 20 MIN: CPT | Mod: 95 | Performed by: INTERNAL MEDICINE

## 2022-12-14 NOTE — PROGRESS NOTES
Alvin is a 77 year old who is being evaluated via a billable video visit.      How would you like to obtain your AVS? MyChart  If the video visit is dropped, the invitation should be resent by: Send to e-mail at: [email or web address]  Will anyone else be joining your video visit? Jesusita AMEZCUA

## 2022-12-14 NOTE — LETTER
12/14/2022         RE: Jam Nice  [address]  [facility] [address]        Dear Colleague,    Thank you for referring your patient, Jam Nice, to the [facility]. Please see a copy of my visit note below.    Alvin is a 77 year old who is being evaluated via a billable video visit.      How would you like to obtain your AVS? MyChart  If the video visit is dropped, the invitation should be resent by: Send to e-mail at: [email or web address]  Will anyone else be joining your video visit? No      Elva AMEZCUA       [facility]  HEMATOLOGY AND ONCOLOGY    FOLLOW-UP VISIT NOTE    PATIENT NAME: Jam Nice MRN # [id number]  DATE OF VISIT: Dec 14, 2022 YOB: 1945    REFERRING PROVIDER: Referred Self, MD  No address on file    CANCER TYPE: Prostate cancer  STAGE: M0 Stage II with biochemical PSA relapse post radiation therapy with concomitant hormones                                                    TREATMENT SUMMARY:  - Prostate biopsy in 2008 for rising PSA - 4.8; revealed [place] 3+3 disease  -  He received two injections of three-month Zoladex Depot (10/3/08 and 1/5/09) in prep for concurrent radiation therapy but opted for surveillance  - Rpt Prostate biopsy for rising PSA done on 10/13/10 revealed again [place] 3+3 disease  - He had repeat prostate biopsy for rising PSA on 8/26/14 which now revealed [place] 3+4 disease  - He was treated with ADT - Zoladex Depot and recieved 3 doses (10.8 mg) on October 8, 2014, January 9, 2015, and April 2, 2015, respectively.  - He underwent definitive radiation therapy from June 9, 2015 --- August 3, 2015 and received 7800 cGy in 39 fractions.  - He is being followed with PSA every 6 months since then     CURRENT INTERVENTIONS:  Observation    SUBJECTIVE   Jam Nice is being followed for prostate cancer with biochemical relapse.     He has no new complains. He continues to do  well. He has no new concerns at this visit.       PAST MEDICAL HISTORY     Past Medical History:   Diagnosis Date     Basal cell carcinoma of skin      Hearing problem Aug 2015     History of radiation therapy 2015    Prostate     Hyperlipidemia     statin intolerant     Prostate cancer (H) 2012    S/P radiation rx     Reduced vision      Tinnitus     Ongoing         CURRENT OUTPATIENT MEDICATIONS     Current Outpatient Medications   Medication Sig     multivitamin w/minerals (MULTIVITAMINS W/MINERALS) tablet Take 1 tablet by mouth daily     PSYLLIUM HUSK PO      tamsulosin (FLOMAX) 0.4 MG capsule Take 2 capsules (0.8 mg) by mouth daily     triamcinolone (KENALOG) 0.1 % external cream Apply topically 2 times daily     No current facility-administered medications for this visit.        ALLERGIES      Allergies   Allergen Reactions     Cats Cough and Itching     Simvastatin      Aches, low energy, not feeling well        REVIEW OF SYSTEMS   As above in the HPI, o/w complete 12-point ROS was negative.     PHYSICAL EXAM   There were no vitals taken for this visit.  GEN: NAD  HEENT: PERRL, EOMI, no icterus, injection or pallor. Oropharynx is clear.  LYMPHATICs: no cervical or supraclavicular lymphadenopathy; no other abn lymphadenopathy  PULMONARY: clear with good air entry bilaterally  CARDIOVASCULAR: regular, no murmurs, rubs, or gallops  GASTROINTESTINAL: soft, non-tender, non-distended, normal bowel sounds, no hepatosplenomegaly by percussion or palpation  MUSCULOSKELTAL: warm, well perfused, no edema  NEURO: awake, alert and oriented to time place and person, cranial nerves intact - II - XII, no focal neurologic deficits  SKIN: no rashes     LABORATORY AND IMAGING STUDIES     Recent Labs   Lab Test 12/07/22  0827 07/18/22  1059 06/06/22  0832 12/06/21  0816 06/28/21  0833    143 140 139 139   POTASSIUM 4.6 5.0 4.8 4.2 4.5   CHLORIDE 105 110* 110* 107 110*   CO2 25 28 26 28 26   ANIONGAP 11 5 4 4 3   BUN 25.0*  25 26 31* 28   CR 0.88 0.84 0.88 0.79 0.90   GLC 90 100* 84 70 81   LUZ 9.0 9.5 8.8 8.4* 8.7     No results for input(s): MAG, PHOS in the last 27736 hours.  Recent Labs   Lab Test 12/07/22  0827 07/18/22  1059 06/06/22  0832 12/06/21  0816 06/28/21  0833   WBC 5.2 6.8 5.2 4.3 4.9   HGB 13.4 13.6 13.5 13.5 13.6    206 201 194 207    100 101* 100 101*   NEUTROPHIL 69 70 69 63 63.8     Recent Labs   Lab Test 12/07/22  0827 07/18/22  1059 06/06/22  0832   BILITOTAL 0.4 0.5 0.6   ALKPHOS 56 64 62   ALT 17 21 19   AST 19 17 10   ALBUMIN 4.1 3.9 3.8     No results found for: TSH  No results for input(s): CEA in the last 24029 hours.  Results for orders placed or performed in visit on 07/10/20   XR Hand Left G/E 3 Views    Narrative    EXAM: XR HAND LT G/E 3 VW  7/10/2020 8:27 AM      HISTORY: Ganglion cyst    COMPARISON: None    FINDINGS: 3 views of left hand.    No acute osseous abnormality. No erosion. Mild triscaphe and first  carpometacarpal joint degenerative change. Degenerative change of the  first and second metacarpophalangeal joints. Cystic changes in the  proximal lunate. Distal radioulnar joint degenerative change.    Rounded soft tissue prominence ulnar to the distal shaft of the third  proximal phalanx measuring up to 11 mm.       Impression    IMPRESSION:   1. Rounded soft tissue prominence ulnar to the distal shaft of the  third proximal phalanx consistent with provided history of ganglion  cyst.    2. Polyarticular degenerative change.         I have personally reviewed the examination and initial interpretation  and I agree with the findings.    MABEL BARNES MD (Joe)     Recent Labs   Lab Test 12/07/22  0827 07/18/22  1059 06/06/22  0832 12/06/21  0816 06/28/21  0833   PSA 0.36 0.36 0.34 0.33 0.36   TESTOSTTOTAL 210 203 141 165 172*        ASSESSMENT AND PLAN   1. Hormone sensitive prostate cancer - [place] 3+4     s/p definitive radiation therapy 7800 cGy in 39 fractions from  6/9-8/3/15    ADT with 3 doses from Oct 2014 through April 2015  2. ECOG PS 0  3. No medical comorbidities  4. Followed every 6 months for surveillance      Bill is doing well. He has no new complains. He had labs done locally. He had labs drawn last week. He has looked at the results.    I have reviewed all of the labs done prior to this clinic visit.  Labs are all completely normal including electrolytes, renal function, hepatic panel, complete blood count and differential except for borderline macrocytosis.    His PSA has come back a stable/unchanged at 0.36 ng/ml which is essentially the same number as last time.  We will continue to monitor as it remained stable.    I will continue to see him in 6 months with PSA and testosterone. I explained him that irrespective of PSA value (unless it is dramatically different), we will not be acting on it at this time. We will continue to follow this. His PSA values for last couple of years have been stable.     I had refilled his tamsulosin last visit.      15 minutes spent on the date of the encounter doing chart review, history and exam, documentation and further activities as noted above            Again, thank you for allowing me to participate in the care of your patient.      Sincerely,    Franky Arroyo MD

## 2022-12-14 NOTE — PROGRESS NOTES
[facility]  HEMATOLOGY AND ONCOLOGY    FOLLOW-UP VISIT NOTE    PATIENT NAME: Jam Nice MRN # [id number]  DATE OF VISIT: Dec 14, 2022 YOB: 1945    REFERRING PROVIDER: Referred Self, MD  No address on file    CANCER TYPE: Prostate cancer  STAGE: M0 Stage II with biochemical PSA relapse post radiation therapy with concomitant hormones                                                    TREATMENT SUMMARY:  - Prostate biopsy in 2008 for rising PSA - 4.8; revealed [place] 3+3 disease  -  He received two injections of three-month Zoladex Depot (10/3/08 and 1/5/09) in prep for concurrent radiation therapy but opted for surveillance  - Rpt Prostate biopsy for rising PSA done on 10/13/10 revealed again [place] 3+3 disease  - He had repeat prostate biopsy for rising PSA on 8/26/14 which now revealed [place] 3+4 disease  - He was treated with ADT - Zoladex Depot and recieved 3 doses (10.8 mg) on October 8, 2014, January 9, 2015, and April 2, 2015, respectively.  - He underwent definitive radiation therapy from June 9, 2015 --- August 3, 2015 and received 7800 cGy in 39 fractions.  - He is being followed with PSA every 6 months since then     CURRENT INTERVENTIONS:  Observation    SUBJECTIVE   Jam Nice is being followed for prostate cancer with biochemical relapse.     He has no new complains. He continues to do well. He has no new concerns at this visit.       PAST MEDICAL HISTORY     Past Medical History:   Diagnosis Date     Basal cell carcinoma of skin      Hearing problem Aug 2015     History of radiation therapy 2015    Prostate     Hyperlipidemia     statin intolerant     Prostate cancer (H) 2012    S/P radiation rx     Reduced vision      Tinnitus     Ongoing         CURRENT OUTPATIENT MEDICATIONS     Current Outpatient Medications   Medication Sig     multivitamin w/minerals (MULTIVITAMINS W/MINERALS) tablet Take 1 tablet by mouth daily     PSYLLIUM HUSK PO      tamsulosin  (FLOMAX) 0.4 MG capsule Take 2 capsules (0.8 mg) by mouth daily     triamcinolone (KENALOG) 0.1 % external cream Apply topically 2 times daily     No current facility-administered medications for this visit.        ALLERGIES      Allergies   Allergen Reactions     Cats Cough and Itching     Simvastatin      Aches, low energy, not feeling well        REVIEW OF SYSTEMS   As above in the HPI, o/w complete 12-point ROS was negative.     PHYSICAL EXAM   There were no vitals taken for this visit.  GEN: NAD  HEENT: PERRL, EOMI, no icterus, injection or pallor. Oropharynx is clear.  LYMPHATICs: no cervical or supraclavicular lymphadenopathy; no other abn lymphadenopathy  PULMONARY: clear with good air entry bilaterally  CARDIOVASCULAR: regular, no murmurs, rubs, or gallops  GASTROINTESTINAL: soft, non-tender, non-distended, normal bowel sounds, no hepatosplenomegaly by percussion or palpation  MUSCULOSKELTAL: warm, well perfused, no edema  NEURO: awake, alert and oriented to time place and person, cranial nerves intact - II - XII, no focal neurologic deficits  SKIN: no rashes     LABORATORY AND IMAGING STUDIES     Recent Labs   Lab Test 12/07/22  0827 07/18/22  1059 06/06/22  0832 12/06/21  0816 06/28/21  0833    143 140 139 139   POTASSIUM 4.6 5.0 4.8 4.2 4.5   CHLORIDE 105 110* 110* 107 110*   CO2 25 28 26 28 26   ANIONGAP 11 5 4 4 3   BUN 25.0* 25 26 31* 28   CR 0.88 0.84 0.88 0.79 0.90   GLC 90 100* 84 70 81   LUZ 9.0 9.5 8.8 8.4* 8.7     No results for input(s): MAG, PHOS in the last 92190 hours.  Recent Labs   Lab Test 12/07/22  0827 07/18/22  1059 06/06/22  0832 12/06/21  0816 06/28/21  0833   WBC 5.2 6.8 5.2 4.3 4.9   HGB 13.4 13.6 13.5 13.5 13.6    206 201 194 207    100 101* 100 101*   NEUTROPHIL 69 70 69 63 63.8     Recent Labs   Lab Test 12/07/22  0827 07/18/22  1059 06/06/22  0832   BILITOTAL 0.4 0.5 0.6   ALKPHOS 56 64 62   ALT 17 21 19   AST 19 17 10   ALBUMIN 4.1 3.9 3.8     No results  found for: TSH  No results for input(s): CEA in the last 27030 hours.  Results for orders placed or performed in visit on 07/10/20   XR Hand Left G/E 3 Views    Narrative    EXAM: XR HAND LT G/E 3 VW  7/10/2020 8:27 AM      HISTORY: Ganglion cyst    COMPARISON: None    FINDINGS: 3 views of left hand.    No acute osseous abnormality. No erosion. Mild triscaphe and first  carpometacarpal joint degenerative change. Degenerative change of the  first and second metacarpophalangeal joints. Cystic changes in the  proximal lunate. Distal radioulnar joint degenerative change.    Rounded soft tissue prominence ulnar to the distal shaft of the third  proximal phalanx measuring up to 11 mm.       Impression    IMPRESSION:   1. Rounded soft tissue prominence ulnar to the distal shaft of the  third proximal phalanx consistent with provided history of ganglion  cyst.    2. Polyarticular degenerative change.         I have personally reviewed the examination and initial interpretation  and I agree with the findings.    MABEL BARNES MD (Joe)     Recent Labs   Lab Test 12/07/22  0827 07/18/22  1059 06/06/22  0832 12/06/21  0816 06/28/21  0833   PSA 0.36 0.36 0.34 0.33 0.36   TESTOSTTOTAL 210 203 141 165 172*        ASSESSMENT AND PLAN   1. Hormone sensitive prostate cancer - [place] 3+4     s/p definitive radiation therapy 7800 cGy in 39 fractions from 6/9-8/3/15    ADT with 3 doses from Oct 2014 through April 2015  2. ECOG PS 0  3. No medical comorbidities  4. Followed every 6 months for surveillance      Bill is doing well. He has no new complains. He had labs done locally. He had labs drawn last week. He has looked at the results.    I have reviewed all of the labs done prior to this clinic visit.  Labs are all completely normal including electrolytes, renal function, hepatic panel, complete blood count and differential except for borderline macrocytosis.    His PSA has come back a stable/unchanged at 0.36 ng/ml which is  essentially the same number as last time.  We will continue to monitor as it remained stable.    I will continue to see him in 6 months with PSA and testosterone. I explained him that irrespective of PSA value (unless it is dramatically different), we will not be acting on it at this time. We will continue to follow this. His PSA values for last couple of years have been stable.     I had refilled his tamsulosin last visit.    Video-Visit Details    Type of service:  Video Visit  Originating Location (pt. Location): Home  Distant Location (provider location): [facility] CANCER [place]   Platform used for Video Visit: [employer]    15 minutes spent on the date of the encounter doing chart review, history and exam, documentation and further activities as noted above      Franky Arroyo    Hematologist and Medical Oncologist  [facility]

## 2022-12-14 NOTE — NURSING NOTE
Patient declined individual allergy and medication review by support staff because patient denies any changes since echeck-in completion and states all information entered during echeck-in remains accurate.    Elva Gracia VF

## 2022-12-19 DIAGNOSIS — R35.1 BENIGN PROSTATIC HYPERPLASIA WITH NOCTURIA: ICD-10-CM

## 2022-12-19 DIAGNOSIS — N40.1 BENIGN PROSTATIC HYPERPLASIA WITH NOCTURIA: ICD-10-CM

## 2022-12-19 DIAGNOSIS — C61 MALIGNANT NEOPLASM OF PROSTATE (H): ICD-10-CM

## 2022-12-20 RX ORDER — TAMSULOSIN HYDROCHLORIDE 0.4 MG/1
0.8 CAPSULE ORAL DAILY
Qty: 180 CAPSULE | Refills: 3 | Status: SHIPPED | OUTPATIENT
Start: 2022-12-20

## 2023-01-04 NOTE — PROGRESS NOTES
[facility]  HEMATOLOGY AND ONCOLOGY    FOLLOW-UP VISIT NOTE    PATIENT NAME: Jam Nice MRN # [id number]  DATE OF VISIT: Derrick 15, 2022 YOB: 1945    REFERRING PROVIDER: Referred Self, MD  No address on file    CANCER TYPE: Prostate cancer  STAGE: M0 Stage II with biochemical PSA relapse post radiation therapy with concomitant hormones                                                    TREATMENT SUMMARY:  - Prostate biopsy in 2008 for rising PSA - 4.8; revealed [place] 3+3 disease  -  He received two injections of three-month Zoladex Depot (10/3/08 and 1/5/09) in prep for concurrent radiation therapy but opted for surveillance  - Rpt Prostate biopsy for rising PSA done on 10/13/10 revealed again [place] 3+3 disease  - He had repeat prostate biopsy for rising PSA on 8/26/14 which now revealed [place] 3+4 disease  - He was treated with ADT - Zoladex Depot and recieved 3 doses (10.8 mg) on October 8, 2014, January 9, 2015, and April 2, 2015, respectively.  - He underwent definitive radiation therapy from June 9, 2015 --- August 3, 2015 and received 7800 cGy in 39 fractions.  - He is being followed with PSA every 6 months since then     CURRENT INTERVENTIONS:  Observation    SUBJECTIVE   Jam Nice is being followed for prostate cancer with biochemical relapse.     He has no new complains. He continues to do well. He has no new concerns at this visit.       PAST MEDICAL HISTORY     Past Medical History:   Diagnosis Date     Basal cell carcinoma of skin      Hearing problem Aug 2015     History of radiation therapy 2015    Prostate     Hyperlipidemia     statin intolerant     Prostate cancer (H) 2012    S/P radiation rx     Reduced vision      Tinnitus     Ongoing         CURRENT OUTPATIENT MEDICATIONS     Current Outpatient Medications   Medication Sig     PSYLLIUM HUSK PO      tamsulosin (FLOMAX) 0.4 MG capsule Take 2 capsules (0.8 mg) by mouth daily     triamcinolone (KENALOG)  0.1 % external cream Apply topically 2 times daily     No current facility-administered medications for this visit.        ALLERGIES      Allergies   Allergen Reactions     Cats Cough and Itching     Simvastatin      Aches, low energy, not feeling well        REVIEW OF SYSTEMS   As above in the HPI, o/w complete 12-point ROS was negative.     PHYSICAL EXAM   There were no vitals taken for this visit.  GEN: NAD  HEENT: PERRL, EOMI, no icterus, injection or pallor. Oropharynx is clear.  LYMPHATICs: no cervical or supraclavicular lymphadenopathy; no other abn lymphadenopathy  PULMONARY: clear with good air entry bilaterally  CARDIOVASCULAR: regular, no murmurs, rubs, or gallops  GASTROINTESTINAL: soft, non-tender, non-distended, normal bowel sounds, no hepatosplenomegaly by percussion or palpation  MUSCULOSKELTAL: warm, well perfused, no edema  NEURO: awake, alert and oriented to time place and person, cranial nerves intact - II - XII, no focal neurologic deficits  SKIN: no rashes     LABORATORY AND IMAGING STUDIES     Recent Labs   Lab Test 06/06/22  0832 12/06/21  0816 06/28/21  0833 12/28/20  1049 07/08/20  0855    139 139 140 140   POTASSIUM 4.8 4.2 4.5 4.1 5.2   CHLORIDE 110* 107 110* 110* 109   CO2 26 28 26 27 29   ANIONGAP 4 4 3 3 2*   BUN 26 31* 28 22 24   CR 0.88 0.79 0.90 0.83 0.85   GLC 84 70 81 101* 81   LUZ 8.8 8.4* 8.7 8.9 8.8     No results for input(s): MAG, PHOS in the last 39984 hours.  Recent Labs   Lab Test 06/06/22  0832 12/06/21  0816 06/28/21  0833 12/28/20  1049 07/08/20  0855   WBC 5.2 4.3 4.9 5.9 6.2   HGB 13.5 13.5 13.6 13.7 14.4    194 207 238 219   * 100 101* 101* 100   NEUTROPHIL 69 63 63.8 66.7 67.2     Recent Labs   Lab Test 06/06/22  0832 12/06/21  0816 06/28/21  0833   BILITOTAL 0.6 0.5 0.4   ALKPHOS 62 60 61   ALT 19 18 22   AST 10 12 15   ALBUMIN 3.8 3.6 3.7     Recent Labs   Lab Test 06/06/22  0832 12/06/21  0816 06/28/21  0833 12/28/20  1049 07/08/20  0855   PSA  0.34 0.33 0.36 0.38 0.38   TESTOSTTOTAL 141 165 172* 229* 172*      ASSESSMENT AND PLAN   1. Hormone sensitive prostate cancer - [place] 3+4     s/p definitive radiation therapy 7800 cGy in 39 fractions from 6/9-8/3/15    ADT with 3 doses from Oct 2014 through April 2015  2. ECOG PS 0  3. No medical comorbidities  4. Followed every 6 months for surveillance      Bill is doing well. He has no new complains. He had labs done locally. He had labs drawn last week. He has looked at the results.    I have reviewed all of the labs done prior to this clinic visit.  Labs are all completely normal including electrolytes, renal function, hepatic panel, complete blood count and differential except for borderline macrocytosis.    His PSA has come back a stable/unchanged at 0.34 ng/ml which is essentially the same number as last time.  We will continue to monitor as it remained stable.    I will continue to see him in 6 months with PSA and testosterone. I explained him that irrespective of PSA value (unless it is dramatically different), we will not be acting on it at this time. We will continue to follow this. His PSA values for last couple of years have been stable.     I had refilled his tamsulosin last visit.    Video-Visit Details    Type of service:  Video Visit  Originating Location (pt. Location): Home  Distant Location (provider location): [facility] CANCER [place]   Platform used for Video Visit: [employer]    15 minutes spent on the date of the encounter doing chart review, history and exam, documentation and further activities as noted above      Franky Arroyo    Hematologist and Medical Oncologist  [facility]      Cibinqo Counseling: I discussed with the patient the risks of Cibinqo therapy including but not limited to common cold, nausea, headache, cold sores, increased blood CPK levels, dizziness, UTIs, fatigue, acne, and vomitting. Live vaccines should be avoided.  This medication has been linked to serious infections; higher rate of mortality; malignancy and lymphoproliferative disorders; major adverse cardiovascular events; thrombosis; thrombocytopenia and lymphopenia; lipid elevations; and retinal detachment.

## 2023-03-03 ENCOUNTER — OFFICE VISIT (OUTPATIENT)
Dept: PODIATRY | Facility: CLINIC | Age: 78
End: 2023-03-03
Payer: MEDICARE

## 2023-03-03 VITALS — WEIGHT: 180 LBS | HEIGHT: 71 IN | BODY MASS INDEX: 25.2 KG/M2 | HEART RATE: 77 BPM

## 2023-03-03 DIAGNOSIS — Q66.70 PES CAVUS: ICD-10-CM

## 2023-03-03 DIAGNOSIS — M20.42 HAMMERTOE, BILATERAL: Primary | ICD-10-CM

## 2023-03-03 DIAGNOSIS — G62.89 OTHER POLYNEUROPATHY: ICD-10-CM

## 2023-03-03 DIAGNOSIS — M20.41 HAMMERTOE, BILATERAL: Primary | ICD-10-CM

## 2023-03-03 PROCEDURE — 99203 OFFICE O/P NEW LOW 30 MIN: CPT | Performed by: PODIATRIST

## 2023-03-03 NOTE — LETTER
3/3/2023         RE: Jam Nice   [address]  [facility] [id number]        Dear Colleague,    Thank you for referring your patient, Jam Nice, to the [facility]. Please see a copy of my visit note below.    Assessment:      ICD-10-CM    1. Hammertoe, bilateral  M20.41 [facility] DME Orthotic; Foot Orthotics    M20.42       2. Other polyneuropathy  G62.89 [facility]otics [facility] Prosthetics DME Orthotic; Foot Orthotics      3. Pes cavus  Q66.70            Plan:  Orders Placed This Encounter   Procedures     Orthotics and Prosthetics DME Orthotic; Foot Orthotics       Discussed the etiology and treatment of the condition with the patient.    Michael - do not recommend surgery if peripheral neuropathy present  Conservative    PCP managing neuropathy      Krysten Burch DPM                  Chief Complaint:     Patient presents with:  Left Foot - Pain  Right Foot - Pain     bilateral nerve symptoms.    HPI:  Jam Nice is a 78 year old year old choose not to disclose who presents for evaluation of nerve symptoms.    Mariaelenae R foot    B12 deficienecy    Past Medical & Surgical History:  Past Medical History:   Diagnosis Date     Basal cell carcinoma of skin      Hearing problem Aug 2015     History of radiation therapy     Prostate     Hyperlipidemia     statin intolerant     Prostate cancer (H)     S/P radiation rx     Reduced vision      Tinnitus     Ongoing      Past Surgical History:   Procedure Laterality Date     .menisectomy Left      HERNIA REPAIR       ORTHOPEDIC SURGERY  1967      Family History   Problem Relation Age of Onset     Alcoholism Mother          90     Cerebrovascular Disease Mother         Possible     Substance Abuse Mother      Migraines Mother      Stomach Problem Mother      Coronary Artery Disease Father          51 MI     Heart Disease Father          at age 59     Substance Abuse Father       "Alcoholism Sister          55     Cancer Sister      Substance Abuse Sister      Substance Abuse Sister      Dementia Sister         Recently diagnosed        Social History:  ?  History   Smoking Status     Former     Packs/day: 1.50     Years: 10.00     Types: Cigarettes     Start date: 1964     Quit date: 1985   Smokeless Tobacco     Never     History   Drug Use No     Social History    Substance and Sexual Activity      Alcohol use: Not Currently      Allergies:  ?   Allergies   Allergen Reactions     Cats Cough and Itching     Simvastatin      Aches, low energy, not feeling well        Medications:    Current Outpatient Medications   Medication     multivitamin w/minerals (THERA-VIT-M) tablet     PSYLLIUM HUSK PO     tamsulosin (FLOMAX) 0.4 MG capsule     triamcinolone (KENALOG) 0.1 % external cream     No current facility-administered medications for this visit.       Physical Exam:  ?  Vitals:  Pulse 77   Ht 1.803 m (5' 11\")   Wt 81.6 kg (180 lb)   BMI 25.10 kg/m     General:  WD/WN, in NAD.  A&O x3.  Dermatologic:    Skin is intact, open lesions absent.   Skin texture, turgor is normal.  Vascular:  Pulses palpable bilateral.  Digital capillary refill time normal bilateral.  Skin temperature is normal bilateral.  Generalized edema- none bilateral.  Neurologic:    Sharp / dull sensation normal to affected foot & ankle.  Protective sensation abnormal, diminished to affected foot & ankle.  Musculoskeletal:  aROM digits, ankle intact.  Muscle strength intact to foot & ankle.  Cavus deformity present.              Again, thank you for allowing me to participate in the care of your patient.        Sincerely,        Krysten Burch DPM    "

## 2023-03-03 NOTE — PATIENT INSTRUCTIONS
PATIENT INSTRUCTIONS - Podiatry / Foot & Ankle Surgery    [place] [facility] LOCATIONS  [facility]  [address]  [place] [address]  Phone: [phone]  Fax: [phone] [facility] Profession [facility]  [address]  [place] [address]  Phone: [phone]   Fax: [phone]   [facility]  [address]  [place] [address]  Phone: [phone]  Fax: [phone] [facility]  [address]  [place] [address]  Phone: [phone]   Fax: [phone]   [facility]   [address]  [place] [address]  Phone: [phone]  Fax: [phone] * Please call any location listed to make an appointment for a casting/fitting. Your referral was sent to their central office and they will all have the order on file.         Toe spacers   Crest pad or silicone toe sleeve    Stretch upper to shoes - to prevent rubbing

## 2023-03-03 NOTE — PROGRESS NOTES
Assessment:      ICD-10-CM    1. Hammertoe, bilateral  M20.41 Orthotics and Prosthetics DME Orthotic; Foot Orthotics    M20.42       2. Other polyneuropathy  G62.89 Orthotics and Prosthetics DME Orthotic; Foot Orthotics      3. Pes cavus  Q66.70            Plan:  Orders Placed This Encounter   Procedures     Orthotics and Prosthetics DME Orthotic; Foot Orthotics       Discussed the etiology and treatment of the condition with the patient.    Michael - do not recommend surgery if peripheral neuropathy present  Conservative    PCP managing neuropathy      Krysten Burch DPM                  Chief Complaint:     Patient presents with:  Left Foot - Pain  Right Foot - Pain     bilateral nerve symptoms.    HPI:  Jam Nice is a 78 year old year old choose not to disclose who presents for evaluation of nerve symptoms.    Sohan R foot    B12 deficienecy    Past Medical & Surgical History:  Past Medical History:   Diagnosis Date     Basal cell carcinoma of skin      Hearing problem Aug 2015     History of radiation therapy     Prostate     Hyperlipidemia     statin intolerant     Prostate cancer (H)     S/P radiation rx     Reduced vision      Tinnitus     Ongoing      Past Surgical History:   Procedure Laterality Date     .menisectomy Left      HERNIA REPAIR       ORTHOPEDIC SURGERY  1967      Family History   Problem Relation Age of Onset     Alcoholism Mother          90     Cerebrovascular Disease Mother         Possible     Substance Abuse Mother      Migraines Mother      Stomach Problem Mother      Coronary Artery Disease Father          51 MI     Heart Disease Father          at age 59     Substance Abuse Father      Alcoholism Sister          55     Cancer Sister      Substance Abuse Sister      Substance Abuse Sister      Dementia Sister         Recently diagnosed        Social History:  ?  History   Smoking Status     Former     Packs/day: 1.50     Years: 10.00  "    Types: Cigarettes     Start date: 1/1/1964     Quit date: 1/1/1985   Smokeless Tobacco     Never     History   Drug Use No     Social History    Substance and Sexual Activity      Alcohol use: Not Currently      Allergies:  ?   Allergies   Allergen Reactions     Cats Cough and Itching     Simvastatin      Aches, low energy, not feeling well        Medications:    Current Outpatient Medications   Medication     multivitamin w/minerals (THERA-VIT-M) tablet     PSYLLIUM HUSK PO     tamsulosin (FLOMAX) 0.4 MG capsule     triamcinolone (KENALOG) 0.1 % external cream     No current facility-administered medications for this visit.       Physical Exam:  ?  Vitals:  Pulse 77   Ht 1.803 m (5' 11\")   Wt 81.6 kg (180 lb)   BMI 25.10 kg/m     General:  WD/WN, in NAD.  A&O x3.  Dermatologic:    Skin is intact, open lesions absent.   Skin texture, turgor is normal.  Vascular:  Pulses palpable bilateral.  Digital capillary refill time normal bilateral.  Skin temperature is normal bilateral.  Generalized edema- none bilateral.  Neurologic:    Sharp / dull sensation normal to affected foot & ankle.  Protective sensation abnormal, diminished to affected foot & ankle.  Musculoskeletal:  aROM digits, ankle intact.  Muscle strength intact to foot & ankle.  Cavus deformity present.          "

## 2023-05-12 ENCOUNTER — MYC MEDICAL ADVICE (OUTPATIENT)
Dept: INTERNAL MEDICINE | Facility: CLINIC | Age: 78
End: 2023-05-12
Payer: MEDICARE

## 2023-05-16 RX ORDER — LATANOPROST 50 UG/ML
1 SOLUTION/ DROPS OPHTHALMIC DAILY
COMMUNITY

## 2023-06-09 ENCOUNTER — TELEPHONE (OUTPATIENT)
Dept: OTOLARYNGOLOGY | Facility: CLINIC | Age: 78
End: 2023-06-09
Payer: MEDICARE

## 2023-06-15 ENCOUNTER — LAB (OUTPATIENT)
Dept: LAB | Facility: CLINIC | Age: 78
End: 2023-06-15
Payer: MEDICARE

## 2023-06-15 DIAGNOSIS — C61 MALIGNANT NEOPLASM OF PROSTATE (H): ICD-10-CM

## 2023-06-15 LAB
ALBUMIN SERPL BCG-MCNC: 4.3 G/DL (ref 3.5–5.2)
ALP SERPL-CCNC: 65 U/L (ref 35–129)
ALT SERPL W P-5'-P-CCNC: 17 U/L (ref 0–70)
ANION GAP SERPL CALCULATED.3IONS-SCNC: 11 MMOL/L (ref 7–15)
AST SERPL W P-5'-P-CCNC: 20 U/L (ref 0–45)
BASOPHILS # BLD AUTO: 0 10E3/UL (ref 0–0.2)
BASOPHILS NFR BLD AUTO: 1 %
BILIRUB SERPL-MCNC: 0.4 MG/DL
BUN SERPL-MCNC: 26.8 MG/DL (ref 8–23)
CALCIUM SERPL-MCNC: 9.4 MG/DL (ref 8.8–10.2)
CHLORIDE SERPL-SCNC: 106 MMOL/L (ref 98–107)
CREAT SERPL-MCNC: 0.87 MG/DL (ref 0.51–1.17)
DEPRECATED HCO3 PLAS-SCNC: 24 MMOL/L (ref 22–29)
EOSINOPHIL # BLD AUTO: 0.1 10E3/UL (ref 0–0.7)
EOSINOPHIL NFR BLD AUTO: 1 %
ERYTHROCYTE [DISTWIDTH] IN BLOOD BY AUTOMATED COUNT: 12.8 % (ref 10–15)
GFR SERPL CREATININE-BSD FRML MDRD: 89 ML/MIN/1.73M2
GLUCOSE SERPL-MCNC: 87 MG/DL (ref 70–99)
HCT VFR BLD AUTO: 42.2 % (ref 35–53)
HGB BLD-MCNC: 13.6 G/DL (ref 11.7–17.7)
IMM GRANULOCYTES # BLD: 0 10E3/UL
IMM GRANULOCYTES NFR BLD: 0 %
LYMPHOCYTES # BLD AUTO: 1.2 10E3/UL (ref 0.8–5.3)
LYMPHOCYTES NFR BLD AUTO: 15 %
MCH RBC QN AUTO: 30.8 PG (ref 26.5–33)
MCHC RBC AUTO-ENTMCNC: 32.2 G/DL (ref 31.5–36.5)
MCV RBC AUTO: 96 FL (ref 78–100)
MONOCYTES # BLD AUTO: 0.5 10E3/UL (ref 0–1.3)
MONOCYTES NFR BLD AUTO: 7 %
NEUTROPHILS # BLD AUTO: 5.8 10E3/UL (ref 1.6–8.3)
NEUTROPHILS NFR BLD AUTO: 76 %
PLATELET # BLD AUTO: 223 10E3/UL (ref 150–450)
POTASSIUM SERPL-SCNC: 4.6 MMOL/L (ref 3.4–5.3)
PROT SERPL-MCNC: 7.1 G/DL (ref 6.4–8.3)
PSA SERPL DL<=0.01 NG/ML-MCNC: 0.32 NG/ML (ref 0–6.5)
RBC # BLD AUTO: 4.41 10E6/UL (ref 3.8–5.9)
SODIUM SERPL-SCNC: 141 MMOL/L (ref 136–145)
WBC # BLD AUTO: 7.7 10E3/UL (ref 4–11)

## 2023-06-15 PROCEDURE — 85025 COMPLETE CBC W/AUTO DIFF WBC: CPT

## 2023-06-15 PROCEDURE — 84153 ASSAY OF PSA TOTAL: CPT

## 2023-06-15 PROCEDURE — 80053 COMPREHEN METABOLIC PANEL: CPT

## 2023-06-15 PROCEDURE — 84403 ASSAY OF TOTAL TESTOSTERONE: CPT

## 2023-06-15 PROCEDURE — 36415 COLL VENOUS BLD VENIPUNCTURE: CPT

## 2023-06-16 LAB — TESTOST SERPL-MCNC: 199 NG/DL (ref 8–950)

## 2023-06-19 ENCOUNTER — MYC MEDICAL ADVICE (OUTPATIENT)
Dept: INTERNAL MEDICINE | Facility: CLINIC | Age: 78
End: 2023-06-19
Payer: MEDICARE

## 2023-06-23 ENCOUNTER — MYC MEDICAL ADVICE (OUTPATIENT)
Dept: INTERNAL MEDICINE | Facility: CLINIC | Age: 78
End: 2023-06-23
Payer: MEDICARE

## 2023-09-23 ENCOUNTER — HEALTH MAINTENANCE LETTER (OUTPATIENT)
Age: 78
End: 2023-09-23

## 2024-11-16 ENCOUNTER — HEALTH MAINTENANCE LETTER (OUTPATIENT)
Age: 79
End: 2024-11-16